# Patient Record
Sex: MALE | Race: BLACK OR AFRICAN AMERICAN | NOT HISPANIC OR LATINO | URBAN - METROPOLITAN AREA
[De-identification: names, ages, dates, MRNs, and addresses within clinical notes are randomized per-mention and may not be internally consistent; named-entity substitution may affect disease eponyms.]

---

## 2019-08-09 ENCOUNTER — INPATIENT (INPATIENT)
Facility: HOSPITAL | Age: 39
LOS: 0 days | Discharge: HOME | End: 2019-08-10
Attending: HOSPITALIST | Admitting: HOSPITALIST
Payer: COMMERCIAL

## 2019-08-09 VITALS
OXYGEN SATURATION: 96 % | RESPIRATION RATE: 18 BRPM | DIASTOLIC BLOOD PRESSURE: 75 MMHG | HEART RATE: 113 BPM | SYSTOLIC BLOOD PRESSURE: 126 MMHG | TEMPERATURE: 102 F

## 2019-08-09 DIAGNOSIS — Z29.9 ENCOUNTER FOR PROPHYLACTIC MEASURES, UNSPECIFIED: ICD-10-CM

## 2019-08-09 DIAGNOSIS — G03.9 MENINGITIS, UNSPECIFIED: ICD-10-CM

## 2019-08-09 DIAGNOSIS — A41.9 SEPSIS, UNSPECIFIED ORGANISM: ICD-10-CM

## 2019-08-09 LAB
ALBUMIN SERPL ELPH-MCNC: 4.6 G/DL — SIGNIFICANT CHANGE UP (ref 3.3–5)
ALP SERPL-CCNC: 60 U/L — SIGNIFICANT CHANGE UP (ref 40–120)
ALT FLD-CCNC: 28 U/L — SIGNIFICANT CHANGE UP (ref 4–41)
ANION GAP SERPL CALC-SCNC: 12 MMO/L — SIGNIFICANT CHANGE UP (ref 7–14)
APPEARANCE UR: CLEAR — SIGNIFICANT CHANGE UP
APTT BLD: 33.9 SEC — SIGNIFICANT CHANGE UP (ref 27.5–36.3)
AST SERPL-CCNC: 29 U/L — SIGNIFICANT CHANGE UP (ref 4–40)
BASE EXCESS BLDV CALC-SCNC: 2.8 MMOL/L — SIGNIFICANT CHANGE UP
BASOPHILS # BLD AUTO: 0.03 K/UL — SIGNIFICANT CHANGE UP (ref 0–0.2)
BASOPHILS NFR BLD AUTO: 0.3 % — SIGNIFICANT CHANGE UP (ref 0–2)
BILIRUB SERPL-MCNC: 0.3 MG/DL — SIGNIFICANT CHANGE UP (ref 0.2–1.2)
BILIRUB UR-MCNC: NEGATIVE — SIGNIFICANT CHANGE UP
BLOOD GAS VENOUS - CREATININE: 1.09 MG/DL — SIGNIFICANT CHANGE UP (ref 0.5–1.3)
BLOOD UR QL VISUAL: NEGATIVE — SIGNIFICANT CHANGE UP
BUN SERPL-MCNC: 10 MG/DL — SIGNIFICANT CHANGE UP (ref 7–23)
CALCIUM SERPL-MCNC: 9.5 MG/DL — SIGNIFICANT CHANGE UP (ref 8.4–10.5)
CHLORIDE BLDV-SCNC: 104 MMOL/L — SIGNIFICANT CHANGE UP (ref 96–108)
CHLORIDE SERPL-SCNC: 98 MMOL/L — SIGNIFICANT CHANGE UP (ref 98–107)
CK SERPL-CCNC: 262 U/L — HIGH (ref 30–200)
CO2 SERPL-SCNC: 25 MMOL/L — SIGNIFICANT CHANGE UP (ref 22–31)
COLOR SPEC: SIGNIFICANT CHANGE UP
CREAT SERPL-MCNC: 1.13 MG/DL — SIGNIFICANT CHANGE UP (ref 0.5–1.3)
EOSINOPHIL # BLD AUTO: 0.01 K/UL — SIGNIFICANT CHANGE UP (ref 0–0.5)
EOSINOPHIL NFR BLD AUTO: 0.1 % — SIGNIFICANT CHANGE UP (ref 0–6)
GAS PNL BLDV: 133 MMOL/L — LOW (ref 136–146)
GLUCOSE BLDV-MCNC: 114 MG/DL — HIGH (ref 70–99)
GLUCOSE SERPL-MCNC: 103 MG/DL — HIGH (ref 70–99)
GLUCOSE UR-MCNC: NEGATIVE — SIGNIFICANT CHANGE UP
HCO3 BLDV-SCNC: 25 MMOL/L — SIGNIFICANT CHANGE UP (ref 20–27)
HCT VFR BLD CALC: 43.6 % — SIGNIFICANT CHANGE UP (ref 39–50)
HCT VFR BLDV CALC: 40.9 % — SIGNIFICANT CHANGE UP (ref 39–51)
HGB BLD-MCNC: 13.1 G/DL — SIGNIFICANT CHANGE UP (ref 13–17)
HGB BLDV-MCNC: 13.3 G/DL — SIGNIFICANT CHANGE UP (ref 13–17)
IMM GRANULOCYTES NFR BLD AUTO: 0.4 % — SIGNIFICANT CHANGE UP (ref 0–1.5)
INR BLD: 1.14 — SIGNIFICANT CHANGE UP (ref 0.88–1.17)
KETONES UR-MCNC: NEGATIVE — SIGNIFICANT CHANGE UP
LACTATE BLDV-MCNC: 1.5 MMOL/L — SIGNIFICANT CHANGE UP (ref 0.5–2)
LEUKOCYTE ESTERASE UR-ACNC: NEGATIVE — SIGNIFICANT CHANGE UP
LYMPHOCYTES # BLD AUTO: 1.07 K/UL — SIGNIFICANT CHANGE UP (ref 1–3.3)
LYMPHOCYTES # BLD AUTO: 11.7 % — LOW (ref 13–44)
MCHC RBC-ENTMCNC: 24.4 PG — LOW (ref 27–34)
MCHC RBC-ENTMCNC: 30 % — LOW (ref 32–36)
MCV RBC AUTO: 81.2 FL — SIGNIFICANT CHANGE UP (ref 80–100)
MONOCYTES # BLD AUTO: 1.08 K/UL — HIGH (ref 0–0.9)
MONOCYTES NFR BLD AUTO: 11.8 % — SIGNIFICANT CHANGE UP (ref 2–14)
NEUTROPHILS # BLD AUTO: 6.93 K/UL — SIGNIFICANT CHANGE UP (ref 1.8–7.4)
NEUTROPHILS NFR BLD AUTO: 75.7 % — SIGNIFICANT CHANGE UP (ref 43–77)
NITRITE UR-MCNC: NEGATIVE — SIGNIFICANT CHANGE UP
NRBC # FLD: 0 K/UL — SIGNIFICANT CHANGE UP (ref 0–0)
PCO2 BLDV: 50 MMHG — SIGNIFICANT CHANGE UP (ref 41–51)
PH BLDV: 7.36 PH — SIGNIFICANT CHANGE UP (ref 7.32–7.43)
PH UR: 6.5 — SIGNIFICANT CHANGE UP (ref 5–8)
PLATELET # BLD AUTO: 171 K/UL — SIGNIFICANT CHANGE UP (ref 150–400)
PMV BLD: 11 FL — SIGNIFICANT CHANGE UP (ref 7–13)
PO2 BLDV: 29 MMHG — LOW (ref 35–40)
POTASSIUM BLDV-SCNC: 3.6 MMOL/L — SIGNIFICANT CHANGE UP (ref 3.4–4.5)
POTASSIUM SERPL-MCNC: 4 MMOL/L — SIGNIFICANT CHANGE UP (ref 3.5–5.3)
POTASSIUM SERPL-SCNC: 4 MMOL/L — SIGNIFICANT CHANGE UP (ref 3.5–5.3)
PROT SERPL-MCNC: 8.4 G/DL — HIGH (ref 6–8.3)
PROT UR-MCNC: NEGATIVE — SIGNIFICANT CHANGE UP
PROTHROM AB SERPL-ACNC: 13 SEC — SIGNIFICANT CHANGE UP (ref 9.8–13.1)
RBC # BLD: 5.37 M/UL — SIGNIFICANT CHANGE UP (ref 4.2–5.8)
RBC # FLD: 15.8 % — HIGH (ref 10.3–14.5)
SAO2 % BLDV: 45.7 % — LOW (ref 60–85)
SODIUM SERPL-SCNC: 135 MMOL/L — SIGNIFICANT CHANGE UP (ref 135–145)
SP GR SPEC: 1.01 — SIGNIFICANT CHANGE UP (ref 1–1.04)
UROBILINOGEN FLD QL: NORMAL — SIGNIFICANT CHANGE UP
WBC # BLD: 9.16 K/UL — SIGNIFICANT CHANGE UP (ref 3.8–10.5)
WBC # FLD AUTO: 9.16 K/UL — SIGNIFICANT CHANGE UP (ref 3.8–10.5)

## 2019-08-09 PROCEDURE — 99223 1ST HOSP IP/OBS HIGH 75: CPT | Mod: GC

## 2019-08-09 PROCEDURE — 71046 X-RAY EXAM CHEST 2 VIEWS: CPT | Mod: 26

## 2019-08-09 PROCEDURE — 70491 CT SOFT TISSUE NECK W/DYE: CPT | Mod: 26

## 2019-08-09 PROCEDURE — 70450 CT HEAD/BRAIN W/O DYE: CPT | Mod: 26

## 2019-08-09 RX ORDER — DEXAMETHASONE 0.5 MG/5ML
10 ELIXIR ORAL ONCE
Refills: 0 | Status: DISCONTINUED | OUTPATIENT
Start: 2019-08-09 | End: 2019-08-09

## 2019-08-09 RX ORDER — VANCOMYCIN HCL 1 G
1000 VIAL (EA) INTRAVENOUS EVERY 12 HOURS
Refills: 0 | Status: DISCONTINUED | OUTPATIENT
Start: 2019-08-10 | End: 2019-08-10

## 2019-08-09 RX ORDER — SODIUM CHLORIDE 9 MG/ML
1000 INJECTION INTRAMUSCULAR; INTRAVENOUS; SUBCUTANEOUS ONCE
Refills: 0 | Status: COMPLETED | OUTPATIENT
Start: 2019-08-09 | End: 2019-08-09

## 2019-08-09 RX ORDER — CEFTRIAXONE 500 MG/1
2000 INJECTION, POWDER, FOR SOLUTION INTRAMUSCULAR; INTRAVENOUS EVERY 12 HOURS
Refills: 0 | Status: DISCONTINUED | OUTPATIENT
Start: 2019-08-09 | End: 2019-08-10

## 2019-08-09 RX ORDER — CEFTRIAXONE 500 MG/1
2000 INJECTION, POWDER, FOR SOLUTION INTRAMUSCULAR; INTRAVENOUS ONCE
Refills: 0 | Status: COMPLETED | OUTPATIENT
Start: 2019-08-09 | End: 2019-08-09

## 2019-08-09 RX ORDER — KETOROLAC TROMETHAMINE 30 MG/ML
15 SYRINGE (ML) INJECTION ONCE
Refills: 0 | Status: DISCONTINUED | OUTPATIENT
Start: 2019-08-09 | End: 2019-08-09

## 2019-08-09 RX ORDER — DEXAMETHASONE 0.5 MG/5ML
10 ELIXIR ORAL ONCE
Refills: 0 | Status: COMPLETED | OUTPATIENT
Start: 2019-08-09 | End: 2019-08-09

## 2019-08-09 RX ORDER — ACETAMINOPHEN 500 MG
975 TABLET ORAL ONCE
Refills: 0 | Status: COMPLETED | OUTPATIENT
Start: 2019-08-09 | End: 2019-08-09

## 2019-08-09 RX ORDER — VANCOMYCIN HCL 1 G
1000 VIAL (EA) INTRAVENOUS ONCE
Refills: 0 | Status: COMPLETED | OUTPATIENT
Start: 2019-08-09 | End: 2019-08-09

## 2019-08-09 RX ORDER — KETOROLAC TROMETHAMINE 30 MG/ML
30 SYRINGE (ML) INJECTION ONCE
Refills: 0 | Status: DISCONTINUED | OUTPATIENT
Start: 2019-08-09 | End: 2019-08-09

## 2019-08-09 RX ADMIN — Medication 250 MILLIGRAM(S): at 14:51

## 2019-08-09 RX ADMIN — Medication 102 MILLIGRAM(S): at 20:16

## 2019-08-09 RX ADMIN — Medication 15 MILLIGRAM(S): at 13:29

## 2019-08-09 RX ADMIN — SODIUM CHLORIDE 1000 MILLILITER(S): 9 INJECTION INTRAMUSCULAR; INTRAVENOUS; SUBCUTANEOUS at 19:52

## 2019-08-09 RX ADMIN — Medication 15 MILLIGRAM(S): at 19:53

## 2019-08-09 RX ADMIN — CEFTRIAXONE 100 MILLIGRAM(S): 500 INJECTION, POWDER, FOR SOLUTION INTRAMUSCULAR; INTRAVENOUS at 22:43

## 2019-08-09 RX ADMIN — SODIUM CHLORIDE 1000 MILLILITER(S): 9 INJECTION INTRAMUSCULAR; INTRAVENOUS; SUBCUTANEOUS at 13:29

## 2019-08-09 RX ADMIN — CEFTRIAXONE 100 MILLIGRAM(S): 500 INJECTION, POWDER, FOR SOLUTION INTRAMUSCULAR; INTRAVENOUS at 14:28

## 2019-08-09 RX ADMIN — Medication 975 MILLIGRAM(S): at 19:53

## 2019-08-09 RX ADMIN — Medication 30 MILLIGRAM(S): at 20:16

## 2019-08-09 NOTE — ED PROVIDER NOTE - OBJECTIVE STATEMENT
39y male with no significant PMH presenting with headache and fever.  Started yesterday with sore throat and fevers, he also had difficulty swallowing. Today developed a diffuse headache with sensitivity to light and neck stiffness, bilateral hand tingling. He also has some substernal chest pain, no SOB. No abdominal pain, nausea, vomiting, urinary symptoms.

## 2019-08-09 NOTE — ED PROVIDER NOTE - ATTENDING CONTRIBUTION TO CARE
I performed a face-to-face evaluation of the patient and performed a history and physical examination. I agree with the history and physical examination.    Elsa: Sore throat and F x 2 days, today HA and neck stiffness, photophobia. Concern for meningitis vs. throat abscess. Abx, CT, LP, labs, IVF. I performed a face-to-face evaluation of the patient and performed a history and physical examination. I agree with the history and physical examination.    Elsa: Sore throat and F x 2 days, today HA and neck stiffness, photophobia. Looks good, neck supple, not confused. Consider meningitis vs. throat abscess vs. viral or bacterial pharyngitis. Abx, CT, labs, IVF. If not improve, consider LP.

## 2019-08-09 NOTE — H&P ADULT - NSHPLABSRESULTS_GEN_ALL_CORE
The Labs were reviewed by me   The Radiology was reviewed by me    EKG tracing reviewed by me        135  |  98  |  10  ----------------------------<  103<H>  4.0   |  25  |  1.13    Ca    9.5      09 Aug 2019 13:02    TPro  8.4<H>  /  Alb  4.6  /  TBili  0.3  /  DBili  x   /  AST  29  /  ALT  28  /  AlkPhos  60            PT/INR - ( 09 Aug 2019 13:02 )   PT: 13.0 SEC;   INR: 1.14          PTT - ( 09 Aug 2019 13:02 )  PTT:33.9 SEC              Urinalysis Basic - ( 09 Aug 2019 16:00 )    Color: LIGHT YELLOW / Appearance: CLEAR / S.013 / pH: 6.5  Gluc: NEGATIVE / Ketone: NEGATIVE  / Bili: NEGATIVE / Urobili: NORMAL   Blood: NEGATIVE / Protein: NEGATIVE / Nitrite: NEGATIVE   Leuk Esterase: NEGATIVE / RBC: x / WBC x   Sq Epi: x / Non Sq Epi: x / Bacteria: x                              13.1   9.16  )-----------( 171      ( 09 Aug 2019 13:02 )             43.6     CAPILLARY BLOOD GLUCOSE The Labs were reviewed by me   The Radiology was reviewed by me    EKG tracing reviewed by me        135  |  98  |  10  ----------------------------<  103<H>  4.0   |  25  |  1.13    Ca    9.5      09 Aug 2019 13:02    TPro  8.4<H>  /  Alb  4.6  /  TBili  0.3  /  DBili  x   /  AST  29  /  ALT  28  /  AlkPhos  60            PT/INR - ( 09 Aug 2019 13:02 )   PT: 13.0 SEC;   INR: 1.14          PTT - ( 09 Aug 2019 13:02 )  PTT:33.9 SEC              Urinalysis Basic - ( 09 Aug 2019 16:00 )    Color: LIGHT YELLOW / Appearance: CLEAR / S.013 / pH: 6.5  Gluc: NEGATIVE / Ketone: NEGATIVE  / Bili: NEGATIVE / Urobili: NORMAL   Blood: NEGATIVE / Protein: NEGATIVE / Nitrite: NEGATIVE   Leuk Esterase: NEGATIVE / RBC: x / WBC x   Sq Epi: x / Non Sq Epi: x / Bacteria: x                              13.1   9.16  )-----------( 171      ( 09 Aug 2019 13:02 )             43.6     CAPILLARY BLOOD GLUCOSE      EKG personally reviewed, Sinus tachycardia, no acute findings.

## 2019-08-09 NOTE — ED ADULT NURSE NOTE - NSIMPLEMENTINTERV_GEN_ALL_ED
Implemented All Universal Safety Interventions:  [place] to call system. Call bell, personal items and telephone within reach. Instruct patient to call for assistance. Room bathroom lighting operational. Non-slip footwear when patient is off stretcher. Physically safe environment: no spills, clutter or unnecessary equipment. Stretcher in lowest position, wheels locked, appropriate side rails in place.

## 2019-08-09 NOTE — ED ADULT NURSE NOTE - OBJECTIVE STATEMENT
39 year old male with no PMH presents with fever, headache, sore throat and cough since yesterday   PIV placed labs drawn

## 2019-08-09 NOTE — ED PROVIDER NOTE - PHYSICAL EXAMINATION
Gen: AAOx3, non-toxic  Head: NCAT  HEENT: EOMI, PERRL, oral mucosa moist with oropharyngeal erythema, normal conjunctiva. No submental tenderness, no elevation of the sublingual space, no neck massess  Lung: CTAB, no respiratory distress, no wheezes/rhonchi/rales B/L, speaking in full sentences  CV: RRR, no murmurs, rubs or gallops  Abd: soft, NTND, no guarding, no CVA tenderness  MSK: no visible deformities, neck supple, full ROM, no TTP  Neuro: No focal sensory or motor deficits  Skin: Warm, well perfused, no rash  Psych: normal affect.   ~Miguel Opal PGY2

## 2019-08-09 NOTE — ED ADULT TRIAGE NOTE - CHIEF COMPLAINT QUOTE
Patient reports HA, dizziness, difficulty swallowing, cough and throat pain x 2 days. Patient is febrile oral in triage 102.1.  Patient's airway patent, breathing spontaneous and unlabored, able to follow commands.

## 2019-08-09 NOTE — H&P ADULT - ASSESSMENT
Patient is a 39 y.o M w/ no significant PMH who p/w fever and headaches concerning for acute meningitis Patient is a 39 y.o M w/ no significant PMH who was admitted for meeting sepsis criteria likely 2/2 strep pharyngitis vs. viral pharyngitis vs acute meningitis

## 2019-08-09 NOTE — H&P ADULT - PROBLEM SELECTOR PLAN 1
-P/w fevers Tmax 103 and headaches concerning for acute meningitis  -CT head negative for hemorrhage   -s/p vanc x1 and ceftriaxone x1   -s/p LP -At presentation, meets SIRS criteria w/ Tmax 103, tachycardia 101-120. Source likely meningitis given headache   -Currently normotensive, lactate 1.5  -s/p NS 1 L bolus x2  -UA negative, CXR clear  -s/p ceftriaxone 2g x1 and vancomycin x1  -f/u BCX, UCX  -tylenol PRN for fever -At presentation, meets SIRS criteria w/ Tmax 103, tachycardia 101-120. Source likely 2/2 pharyngitis vs. meningitis   -Currently normotensive, lactate 1.5, no leukocytosis   -s/p NS 1 L bolus x2  -UA negative, CXR clear  -s/p ceftriaxone 2g x1 and vancomycin x1  -f/u BCX, UCX  -tylenol PRN for fever -At presentation, meets SIRS criteria w/ Tmax 103, tachycardia 101-120. Source likely 2/2 pharyngitis vs. meningitis   -Appears non-toxic; currently normotensive, lactate 1.5, no leukocytosis   -s/p NS 1 L bolus x2  -UA negative, CXR clear  -s/p ceftriaxone 2g x1 and vancomycin x1  -f/u BCX, UCX  -tylenol PRN for fever -At presentation, meets SIRS criteria w/ Tmax 103, tachycardia 101-120. Source likely 2/2 pharyngitis vs. meningitis   -Appears non-toxic; currently normotensive, lactate 1.5, no leukocytosis   -s/p NS 1 L bolus x2  -UA negative, CXR clear  -s/p ceftriaxone 2g x1 and vancomycin x1  -Per Centor criterion; score of 2 (absent cough, fever, unable to visual tonsils). f/u rapid antigen strep test  -Low suspicion for acute bacterial meningitis as appears non-toxic, no neck stiffness, and headache is resolving s/p toradol, but will r/o. Plan for IR guided LP  -Will c/w empiric abx therapy to cover for meningitis for now  -f/u BCX, UCX  -tylenol PRN for fever -At presentation, meets SIRS criteria w/ Tmax 103, tachycardia 101-120. Source likely 2/2 pharyngitis vs. meningitis   -Appears non-toxic; currently normotensive, lactate 1.5, no leukocytosis   -s/p NS 1 L bolus x2  -UA negative, CXR clear  -s/p ceftriaxone 2g x1 and vancomycin x1  -Per Centor criterion; score of 3 (absent cough, fever, tonsillar exudates noted). f/u strep culture  -Low suspicion for acute bacterial meningitis as appears non-toxic, no neck stiffness, and headache is resolving s/p toradol, but will r/o. Plan for IR guided LP  -Will c/w empiric abx therapy to cover for meningitis for now  -f/u BCX, UCX  -tylenol PRN for fever

## 2019-08-09 NOTE — ED PROCEDURE NOTE - ATTENDING CONTRIBUTION TO CARE
Karina Carias M.D: pt with fever headache neck pain, requiring LP to ro meningitis. performed by dr ram, with myself at bedside. pt unable to stay still during procedure and thus unable to obtain csf.

## 2019-08-09 NOTE — H&P ADULT - PROBLEM SELECTOR PLAN 3
IMPROVE: 4; will hold off on anticoagulation for anticipated LP procedure   Diet: regular diet -P/w fevers Tmax 103 and headaches; need to r/o community acquired acute meningitis; CT head negative for hemorrhage   -s/p vanc x1 and ceftriaxone x1   -s/p decadron IV 10 x1 to prevent neurological sequelae in setting of possible acute meningitis, particularly S. pneumo. No neurological deficits noted  -Unable to obtain LP in ED; consult IR in AM   -Given age and no known comorbidities, most common organisms for meningitis include S. pneumo, N. meningitidis, H. influenza  -will c/w empiric abx ceftriaxone 2 g IV daily, vancomycin 1 g daily, and acyclovir IV q8h for now  -neuro checks q4h

## 2019-08-09 NOTE — H&P ADULT - NSHPREVIEWOFSYSTEMS_GEN_ALL_CORE
CONSTITUTIONAL:  No weight loss, fever, chills, weakness or fatigue.  HEENT:  Eyes:  No visual loss, blurred vision, double vision or yellow sclerae. Ears, Nose, Throat:  No hearing loss, sneezing, congestion, runny nose or sore throat.  SKIN:  No rash or itching.  CARDIOVASCULAR:  No chest pain, chest pressure or chest discomfort. No palpitations or edema.  RESPIRATORY:  No shortness of breath, cough or sputum.  GASTROINTESTINAL:  No anorexia, nausea, vomiting or diarrhea. No abdominal pain or blood.  GENITOURINARY:  Denies hematuria, dysuria.   NEUROLOGICAL:  No headache, dizziness, syncope, paralysis, ataxia, numbness or tingling in the extremities. No change in bowel or bladder control.  MUSCULOSKELETAL:  No muscle, back pain, joint pain or stiffness.  HEMATOLOGIC:  No anemia, bleeding or bruising.  LYMPHATICS:  No enlarged nodes. No history of splenectomy.  PSYCHIATRIC:  No history of depression or anxiety.  ENDOCRINOLOGIC:  No reports of sweating, cold or heat intolerance. No polyuria or polydipsia.  ALLERGIES:  No history of asthma, hives, eczema or rhinitis. CONSTITUTIONAL: +fever, No weight loss, weakness or fatigue.  HEENT:  Eyes:  No visual loss, blurred vision, double vision or yellow sclerae. Ears, Nose, Throat:  No hearing loss, sneezing, congestion, runny nose or sore throat.  SKIN:  No rash or itching.  CARDIOVASCULAR:  No chest pain, chest pressure or chest discomfort. No palpitations or edema.  RESPIRATORY:  No shortness of breath, cough or sputum.  GASTROINTESTINAL:  No anorexia, nausea, vomiting or diarrhea. No abdominal pain or blood.  GENITOURINARY:  Denies hematuria, dysuria.   NEUROLOGICAL:  No headache, dizziness, syncope, paralysis, ataxia, numbness or tingling in the extremities. No change in bowel or bladder control.  MUSCULOSKELETAL:  No muscle, back pain, joint pain or stiffness.  HEMATOLOGIC:  No anemia, bleeding or bruising.  LYMPHATICS:  No enlarged nodes. No history of splenectomy.  PSYCHIATRIC:  No history of depression or anxiety.  ENDOCRINOLOGIC:  No reports of sweating, cold or heat intolerance. No polyuria or polydipsia.  ALLERGIES:  No history of asthma, hives, eczema or rhinitis. CONSTITUTIONAL: +fever, No weight loss, weakness or fatigue.  HEENT:  Eyes:  No visual loss, blurred vision, double vision or yellow sclerae. Ears, Nose, Throat:  +sore throat, No hearing loss, sneezing, congestion, runny nose   SKIN:  No rash or itching.  CARDIOVASCULAR:  No chest pain, chest pressure or chest discomfort. No palpitations or edema.  RESPIRATORY:  No shortness of breath, cough or sputum.  GASTROINTESTINAL:  No anorexia, nausea, vomiting or diarrhea. No abdominal pain or blood.  GENITOURINARY:  Denies hematuria, dysuria.   NEUROLOGICAL:  No headache, dizziness, syncope, paralysis, ataxia, numbness or tingling in the extremities. No change in bowel or bladder control.  MUSCULOSKELETAL:  No muscle, back pain, joint pain or stiffness.  HEMATOLOGIC:  No anemia, bleeding or bruising.  LYMPHATICS:  No enlarged nodes. No history of splenectomy.  PSYCHIATRIC:  No history of depression or anxiety.  ENDOCRINOLOGIC:  No reports of sweating, cold or heat intolerance. No polyuria or polydipsia.  ALLERGIES:  No history of asthma, hives, eczema or rhinitis. CONSTITUTIONAL: +fever, No weight loss, weakness or fatigue.  HEENT:  Eyes:  No visual loss, blurred vision, double vision or yellow sclerae. Ears, Nose, Throat:  +sore throat, No hearing loss, sneezing, congestion, runny nose   SKIN:  No rash or itching.  CARDIOVASCULAR:  No chest pain, chest pressure or chest discomfort. No palpitations or edema.  RESPIRATORY:  No shortness of breath, cough or sputum.  GASTROINTESTINAL:  No anorexia, nausea, vomiting or diarrhea. No abdominal pain or blood.  GENITOURINARY:  Denies hematuria, dysuria.   NEUROLOGICAL:  No headache, dizziness, syncope, paralysis, ataxia, numbness or tingling in the extremities. No change in bowel or bladder control.  MUSCULOSKELETAL:  No muscle, back pain, joint pain or stiffness.

## 2019-08-09 NOTE — ED PROVIDER NOTE - CLINICAL SUMMARY MEDICAL DECISION MAKING FREE TEXT BOX
Elsa: Sore throat and F x 2 days, today HA and neck stiffness, photophobia. Concern for meningitis vs. throat abscess. Abx, CT, LP, labs, IVF. Elsa: Sore throat and F x 2 days, today HA and neck stiffness, photophobia. Looks good, neck supple, not confused. Consider meningitis vs. throat abscess vs. viral or bacterial pharyngitis. Abx, CT, labs, IVF. If not improve, consider LP.

## 2019-08-09 NOTE — H&P ADULT - NSHPPHYSICALEXAM_GEN_ALL_CORE
PHYSICAL EXAM:  GENERAL: NAD, well-developed  HEAD:  Atraumatic, Normocephalic  EYES: EOMI, PERRLA, conjunctiva and sclera clear  NECK: Supple, No JVD  CHEST/LUNG: Clear to auscultation bilaterally; No wheeze  HEART: Regular rate and rhythm; No murmurs, rubs, or gallops  ABDOMEN: Soft, Nontender, Nondistended; Bowel sounds present  EXTREMITIES:  2+ Peripheral Pulses, No clubbing, cyanosis, or edema  PSYCH: AAOx3  NEUROLOGY: non-focal  SKIN: No rashes or lesions PHYSICAL EXAM:  GENERAL: NAD, well-developed  HEAD:  Atraumatic, Normocephalic, unable to visualize posterior pharynx, no ulcers/lesions on tongue  EYES: EOMI, PERRLA, pterygium noted in medial aspect of left eye, no scleral icterus  NECK: Supple, no neck stiffness, no lymphadenopathy, No JVD  CHEST/LUNG: Clear to auscultation bilaterally; No wheeze  HEART: S1 and S2, tachycardic, normal rhythm; No murmurs, rubs, or gallops  ABDOMEN: Soft, Nontender, Nondistended; Bowel sounds present  EXTREMITIES:  2+ Peripheral Pulses, No clubbing, cyanosis, or edema  PSYCH: AAOx3  NEUROLOGY: CN II-XII intact  SKIN: No rashes or lesions T(C): 36.9 (08-10-19 @ 04:30), Max: 39.4 (08-09-19 @ 19:40)  HR: 116 (08-10-19 @ 04:30) (101 - 120)  BP: 144/93 (08-10-19 @ 04:30) (122/74 - 150/74)  RR: 16 (08-10-19 @ 04:30) (16 - 22)  SpO2: 98% (08-10-19 @ 04:30) (96% - 100%)    Constitutional: NAD, well-developed, well-nourished  Ears, Nose, Mouth, and Throat: +tonsillar exudates, normal external ears and nose, normal hearing  Eyes: normal conjunctiva, EOMI, PERRL, + pterygium noted in medial aspect of left eye  Neck: supple, no JVD  Respiratory: Clear to auscultation bilaterally. No wheezes, rales or rhonchi. Normal respiratory effort  Cardiovascular: RRR, no M/R/G, no edema, 2+ Peripheral Pulses  Gastrointestinal: soft, nontender, nondistended, +BS, no hernia  Skin: warm, dry, no rash  Neurologic: sensation grossly intact, CN grossly intact, non-focal exam  Musculoskeletal: no clubbing, no cyanosis, no joint swelling  Psychiatric: AOX3, appropriate mood, affect

## 2019-08-09 NOTE — H&P ADULT - NSHPSOCIALHISTORY_GEN_ALL_CORE
Smoking:   Alcohol:  drugs: Smoking: denies  Alcohol: socially   drugs: denies  sexually active w/ wife only   works as

## 2019-08-09 NOTE — ED PROVIDER NOTE - NS ED ROS FT
Gen: + fever  Eyes: +sensitivity to light. No eye irritation or discharge  ENT: +sore throat  Resp: No cough or SOB  Cardiovascular: + chest pain  GI: No abdominal pain, nausea/vomiting, diarrhea, constipation  :  No change in urine output or frequency; no dysuria  MS: diffuse muscle aches  Skin: No rashes  Neuro: + headache; bilateral hand tingling. No numbness or weakness  Remainder negative, except as per the HPI

## 2019-08-09 NOTE — ED PROVIDER NOTE - PROGRESS NOTE DETAILS
Karina Carias M.D: pt signed out to me pending ct heda and neck and possible LP by Dr Hunter. PGY2/MD Rosi. Pt feels better, light borthering him, no leulocytosis, likely aseptic meningitis, will LP him. Tachycardia, temp over 103, well appearing, temporal improvement from tylenhol/ketolac, needs admission for intractable fever, tachy, possible meningitis. started abx as bacterial meningitis. Karina Carias M.D: LP attempted without success. pt unable to tolerate, continued to sit up and move around while attempting procedure. in too much pain to successfully get tap. expressed findings to MAR who is aware.

## 2019-08-09 NOTE — H&P ADULT - PROBLEM SELECTOR PLAN 2
-P/w fevers Tmax 103 and headaches concerning for community acquired acute meningitis; CT head negative for hemorrhage   -s/p vanc x1 and ceftriaxone x1   -s/p decadron IV 10 x1 to prevent neurological sequelae in setting of possible acute meningitis, particularly S. pneumo. No neurological deficits noted  -s/p LP; f/u CSF analysis  -Given age and no known comorbidities, most common organisms for meningitis include S. pneumo and N. meningitis  -will c/w empiric abx ceftriaxone 2 g IV daily and vancomycin 1 g daily for now  -neuro checks q4h -P/w fevers Tmax 103 and headaches; need to r/o community acquired acute meningitis; CT head negative for hemorrhage   -s/p vanc x1 and ceftriaxone x1   -s/p decadron IV 10 x1 to prevent neurological sequelae in setting of possible acute meningitis, particularly S. pneumo. No neurological deficits noted  -Unable to obtain LP in ED; consult IR in AM   -Given age and no known comorbidities, most common organisms for meningitis include S. pneumo and N. meningitis  -will c/w empiric abx ceftriaxone 2 g IV daily and vancomycin 1 g daily for now  -neuro checks q4h -P/w fevers Tmax 103 and headaches; need to r/o community acquired acute meningitis; CT head negative for hemorrhage   -s/p vanc x1 and ceftriaxone x1   -s/p decadron IV 10 x1 to prevent neurological sequelae in setting of possible acute meningitis, particularly S. pneumo. No neurological deficits noted  -Unable to obtain LP in ED; consult IR in AM   -Given age and no known comorbidities, most common organisms for meningitis include S. pneumo, N. meningitidis, H. influenza  -will c/w empiric abx ceftriaxone 2 g IV daily and vancomycin 1 g daily for now  -neuro checks q4h -P/w fevers Tmax 103 and headaches; need to r/o community acquired acute meningitis; CT head negative for hemorrhage   -s/p vanc x1 and ceftriaxone x1   -s/p decadron IV 10 x1 to prevent neurological sequelae in setting of possible acute meningitis, particularly S. pneumo. No neurological deficits noted  -Unable to obtain LP in ED; consult IR in AM   -Given age and no known comorbidities, most common organisms for meningitis include S. pneumo, N. meningitidis, H. influenza  -will c/w empiric abx ceftriaxone 2 g IV daily, vancomycin 1 g daily, and acyclovir IV q8h for now  -neuro checks q4h -Per Centor criterion; score of 3 (absent cough, fever, tonsillar exudates noted). f/u strep culture, c/w Abx as above

## 2019-08-09 NOTE — H&P ADULT - HISTORY OF PRESENT ILLNESS
Patient is a 39 y.o M w/ no PMH who presents w/ headaches and fevers that started yesterday. **THIS NOTE IS INCOMPLETE** Patient is a 39 y.o M w/ no PMH who presents w/ headaches, fevers, and sore throat. 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 Since then, he started to have a sore throat that progressively got worse since Tuesday. His sore throat worsens w/ swallowing and he denies any cough, sputum production, or recent sick contacts. He also admits to a generalized headache and malaise w/o associated  neck pain/stiffness, rashes, confusion, photophobia or phonophobia. Yesterday, patient reports feeling fever/chills which prompted him to come into the E.D. Patient denies symptoms of CP, SOB, abdominal pain, diarrhea, or dysuria.    In the ED, vitals T 103, -120 /74 - 150/74 RR 16-20 SPO2 % on RA. UA neg, CT head revealed no hemorrhage, CT neck mildly enlarged left cervical II lymph node. s/p vanc x1, ceftriaxone 2g x1, decadron 10 x1, toradol, NS 1 L bolus x2. An LP was attempted in the E.D. but was unable to obtain sample. Patient is a 39 y.o M w/ no PMH who presents w/ headaches, fevers, and sore throat. 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 Since then, he started to have a sore throat that progressively got worse since Tuesday. His sore throat worsens w/ swallowing and he denies any cough, sputum production, rhinorrhea, or recent sick contacts. He also admits to a generalized headache and malaise w/o associated  neck pain/stiffness, rashes, confusion, photophobia or phonophobia. Yesterday, patient reports feeling fever/chills which prompted him to come into the E.D. Patient denies symptoms of CP, SOB, abdominal pain, diarrhea, or dysuria.    In the ED, vitals T 103, -120 /74 - 150/74 RR 16-20 SPO2 % on RA. UA neg, CT head revealed no hemorrhage, CT neck mildly enlarged left cervical II lymph node. s/p vanc x1, ceftriaxone 2g x1, decadron 10 x1, toradol, NS 1 L bolus x2. An LP was attempted in the E.D. but was unable to obtain sample.

## 2019-08-10 ENCOUNTER — TRANSCRIPTION ENCOUNTER (OUTPATIENT)
Age: 39
End: 2019-08-10

## 2019-08-10 VITALS
RESPIRATION RATE: 17 BRPM | SYSTOLIC BLOOD PRESSURE: 147 MMHG | TEMPERATURE: 99 F | OXYGEN SATURATION: 100 % | DIASTOLIC BLOOD PRESSURE: 76 MMHG | HEART RATE: 112 BPM

## 2019-08-10 DIAGNOSIS — G03.9 MENINGITIS, UNSPECIFIED: ICD-10-CM

## 2019-08-10 DIAGNOSIS — J02.9 ACUTE PHARYNGITIS, UNSPECIFIED: ICD-10-CM

## 2019-08-10 DIAGNOSIS — J02.0 STREPTOCOCCAL PHARYNGITIS: ICD-10-CM

## 2019-08-10 DIAGNOSIS — A41.9 SEPSIS, UNSPECIFIED ORGANISM: ICD-10-CM

## 2019-08-10 LAB

## 2019-08-10 PROCEDURE — 99239 HOSP IP/OBS DSCHRG MGMT >30: CPT | Mod: GC

## 2019-08-10 RX ORDER — ACETAMINOPHEN 500 MG
650 TABLET ORAL EVERY 6 HOURS
Refills: 0 | Status: DISCONTINUED | OUTPATIENT
Start: 2019-08-10 | End: 2019-08-10

## 2019-08-10 RX ORDER — AMOXICILLIN 250 MG/5ML
1 SUSPENSION, RECONSTITUTED, ORAL (ML) ORAL
Qty: 16 | Refills: 0
Start: 2019-08-10 | End: 2019-08-17

## 2019-08-10 RX ORDER — ACYCLOVIR SODIUM 500 MG
VIAL (EA) INTRAVENOUS
Refills: 0 | Status: DISCONTINUED | OUTPATIENT
Start: 2019-08-10 | End: 2019-08-10

## 2019-08-10 RX ORDER — SODIUM CHLORIDE 9 MG/ML
500 INJECTION, SOLUTION INTRAVENOUS ONCE
Refills: 0 | Status: COMPLETED | OUTPATIENT
Start: 2019-08-10 | End: 2019-08-10

## 2019-08-10 RX ORDER — CEFTRIAXONE 500 MG/1
1000 INJECTION, POWDER, FOR SOLUTION INTRAMUSCULAR; INTRAVENOUS EVERY 24 HOURS
Refills: 0 | Status: DISCONTINUED | OUTPATIENT
Start: 2019-08-10 | End: 2019-08-10

## 2019-08-10 RX ORDER — BENZOCAINE AND MENTHOL 5; 1 G/100ML; G/100ML
1 LIQUID ORAL EVERY 4 HOURS
Refills: 0 | Status: DISCONTINUED | OUTPATIENT
Start: 2019-08-10 | End: 2019-08-10

## 2019-08-10 RX ADMIN — BENZOCAINE AND MENTHOL 1 LOZENGE: 5; 1 LIQUID ORAL at 10:46

## 2019-08-10 RX ADMIN — BENZOCAINE AND MENTHOL 1 LOZENGE: 5; 1 LIQUID ORAL at 17:07

## 2019-08-10 RX ADMIN — SODIUM CHLORIDE 1000 MILLILITER(S): 9 INJECTION, SOLUTION INTRAVENOUS at 12:19

## 2019-08-10 RX ADMIN — Medication 650 MILLIGRAM(S): at 07:03

## 2019-08-10 RX ADMIN — Medication 250 MILLIGRAM(S): at 05:56

## 2019-08-10 RX ADMIN — CEFTRIAXONE 100 MILLIGRAM(S): 500 INJECTION, POWDER, FOR SOLUTION INTRAMUSCULAR; INTRAVENOUS at 10:46

## 2019-08-10 RX ADMIN — Medication 650 MILLIGRAM(S): at 06:03

## 2019-08-10 NOTE — PROGRESS NOTE ADULT - PROBLEM SELECTOR PLAN 1
- Initially presented febrile and tachycardic, concerning for sepsis.  - Lactate 1.5, current SOFA/QSOFA 0, SIRS 1 for tachycardia  - Unlikely sepsis, f/u blood cultures  - Received 2L crystalloid, developed no hypotension, currently afebrile - Initially presented febrile and tachycardic, concerning for sepsis; however source likely pharyngitis.   - Lactate 1.5, current SOFA/QSOFA 0, SIRS 1 for tachycardia  - Unlikely sepsis, f/u blood cultures  - Received 2L crystalloid, developed no hypotension, currently afebrile - Initially presented febrile and tachycardic, concerning for sepsis; however, currently only tachycardic;  source likely pharyngitis.   - Lactate 1.5, current SOFA/QSOFA 0, SIRS 1 for tachycardia  - Unlikely sepsis, f/u blood cultures  - Received 2L crystalloid, developed no hypotension, currently afebrile

## 2019-08-10 NOTE — PROGRESS NOTE ADULT - SUBJECTIVE AND OBJECTIVE BOX
[facility]ican[facility] Quintanilla [facility]  Progress note    Subjective: 39 year old male with no past medical history complaining of several days of headache, fever, and sore throat who experienced a rapid worsening of symptoms on 8/10 and presented to the ED where he presented febrile and was concerning for sepsis possibly 2/2 meningitis and received IV Ceftriaxone, Vancomycin, Acyclovir post failed LP    Interval: patient admitted overnight, since arriving states he feels significantly better post abx, IVF, and acetominophen. Headache is resolved, denies neck stiffness or photophobia. States only currently complain is a sore throat, which he is concerned about a strep infection due to having a young child at home. Denies CP, SOB, weakness, chills, diarrhea, lethargy, confusion.     Vital Signs Last 24 Hrs  T(C): 36.9 (10 Aug 2019 04:30), Max: 39.4 (09 Aug 2019 19:40)  T(F): 98.4 (10 Aug 2019 04:30), Max: 103 (09 Aug 2019 19:40)  HR: 116 (10 Aug 2019 04:30) (101 - 120)  BP: 144/93 (10 Aug 2019 04:30) (122/74 - 150/74)  BP(mean): --  RR: 16 (10 Aug 2019 04:30) (16 - 22)  SpO2: 98% (10 Aug 2019 04:30) (96% - 100%)    PHYSICAL EXAM:  GENERAL: NAD, well-groomed, well-developed  HEENT - EOMI, pupils PERRL, pterygium noted on L eye, throat erethematous w/ tonsilar exudate  NECK: No JVD, trachea midline, no palpable lymph nodes, no tenderness to palpation, no massess appreciated, full range of motion, supple, no nuchal rigidity, negative brudnizky  CHEST/LUNG: CTA bilat, no chest wall tenderness, equal and symmetric chest rise  HEART: Regular rhythm; No murmurs, rubs, or gallops  ABDOMEN: Soft, Nontender, Nondistended  EXTREMITIES:  2+ Peripheral Pulses, No cyanosis or edema  NEURO:  A+O3, no gross FND, CN II-XII grossly intact  SKIN: No rashes or lesions    Consultant(s) Notes Reviewed:  [x ] YES  [ ] NO  Care Discussed with Consultants/Other Providers [ x] YES  [ ] NO    LABS:      LABS:  08-09 @ 13:02 Creatine 262 u/L<H> [30 - 200]  cret                        13.1   9.16  )-----------( 171      ( 09 Aug 2019 13:02 )             43.6     08-09    135  |  98  |  10  ----------------------------<  103<H>  4.0   |  25  |  1.13    Ca    9.5      09 Aug 2019 13:02    TPro  8.4<H>  /  Alb  4.6  /  TBili  0.3  /  DBili  x   /  AST  29  /  ALT  28  /  AlkPhos  60  08-09    PT/INR - ( 09 Aug 2019 13:02 )   PT: 13.0 SEC;   INR: 1.14          PTT - ( 09 Aug 2019 13:02 )  PTT:33.9 SEC                RADIOLOGY & ADDITIONAL TESTS:    Imaging Personally Reviewed:  [X] YES  [ ] NO      MedsMEDICATIONS  (STANDING):  acyclovir IVPB      cefTRIAXone   IVPB 2000 milliGRAM(s) IV Intermittent every 12 hours  vancomycin  IVPB 1000 milliGRAM(s) IV Intermittent every 12 hours    MEDICATIONS  (PRN):  acetaminophen   Tablet .. 650 milliGRAM(s) Oral every 6 hours PRN Mild Pain (1 - 3), Moderate Pain (4 - 6)  benzocaine 15 mG/menthol 3.6 mG Lozenge 1 Lozenge Oral every 4 hours PRN Sore Throat      HEALTH ISSUES - PROBLEM Dx:  Strep pharyngitis: Strep pharyngitis  Sepsis: Sepsis  Prophylactic measure: Prophylactic measure [facility] Quintanilla [facility]  Progress note    Subjective: 39 year old male with no past medical history complaining of several days of headache, fever, and sore throat who experienced a rapid worsening of symptoms on 8/10 and came to the ED where he presented febrile and was concerning for sepsis possibly 2/2 meningitis and received IV Ceftriaxone, Vancomycin, Acyclovir post failed LP    Interval: Patient admitted overnight, since arriving states he feels significantly better post abx, IVF, and acetominophen. Headache is resolved, denies neck stiffness or photophobia. States only currently complain is a sore throat, which he is concerned about a strep infection due to having a young child at home. Denies CP, SOB, weakness, chills, diarrhea, lethargy, confusion.     Vital Signs Last 24 Hrs  T(C): 36.9 (10 Aug 2019 04:30), Max: 39.4 (09 Aug 2019 19:40)  T(F): 98.4 (10 Aug 2019 04:30), Max: 103 (09 Aug 2019 19:40)  HR: 116 (10 Aug 2019 04:30) (101 - 120)  BP: 144/93 (10 Aug 2019 04:30) (122/74 - 150/74)  BP(mean): --  RR: 16 (10 Aug 2019 04:30) (16 - 22)  SpO2: 98% (10 Aug 2019 04:30) (96% - 100%)    PHYSICAL EXAM:  GENERAL: NAD, well-groomed, well-developed  HEENT - EOMI, pupils PERRL, pterygium noted on L eye, throat erethematous w/ tonsilar exudate  NECK: No JVD, trachea midline, no palpable lymph nodes in anterior cervical or occiput, no tenderness to palpation, no massess appreciated, full range of motion, supple, no nuchal rigidity, negative brudnizky  CHEST/LUNG: CTA bilat, no chest wall tenderness, equal and symmetric chest rise  HEART: Regular rhythm; No murmurs, rubs, or gallops  ABDOMEN: Soft, Nontender, Nondistended  EXTREMITIES:  2+ Peripheral Pulses, No cyanosis or edema  NEURO:  A+O3, no gross FND, CN II-XII grossly intact  SKIN: No rashes or lesions    Consultant(s) Notes Reviewed:  [x ] YES  [ ] NO  Care Discussed with Consultants/Other Providers [ x] YES  [ ] NO    LABS:      LABS:  08-09 @ 13:02 Creatine 262 u/L<H> [30 - 200]  cret                        13.1   9.16  )-----------( 171      ( 09 Aug 2019 13:02 )             43.6     08-09    135  |  98  |  10  ----------------------------<  103<H>  4.0   |  25  |  1.13    Ca    9.5      09 Aug 2019 13:02    TPro  8.4<H>  /  Alb  4.6  /  TBili  0.3  /  DBili  x   /  AST  29  /  ALT  28  /  AlkPhos  60  08-09    PT/INR - ( 09 Aug 2019 13:02 )   PT: 13.0 SEC;   INR: 1.14          PTT - ( 09 Aug 2019 13:02 )  PTT:33.9 SEC                RADIOLOGY & ADDITIONAL TESTS:    Imaging Personally Reviewed:  [X] YES  [ ] NO      MedsMEDICATIONS  (STANDING):  cefTRIAXone   IVPB 1000 milliGRAM(s) IV Intermittent every 12 hours    MEDICATIONS  (PRN):  acetaminophen   Tablet .. 650 milliGRAM(s) Oral every 6 hours PRN Mild Pain (1 - 3), Moderate Pain (4 - 6)  benzocaine 15 mG/menthol 3.6 mG Lozenge 1 Lozenge Oral every 4 hours PRN Sore Throat      HEALTH ISSUES - PROBLEM Dx:  Strep pharyngitis: Strep pharyngitis  Sepsis: Sepsis  Prophylactic measure: Prophylactic measure [facility] Quintanilla [facility]  Progress note    Subjective: 39 year old male with no past medical history complaining of several days of headache, fever, and sore throat who experienced a rapid worsening of symptoms on 8/10 and came to the ED where he presented febrile and was concerning for sepsis possibly 2/2 meningitis and received IV Ceftriaxone, Vancomycin, Acyclovir post failed LP    Interval: Patient admitted overnight, since arriving states he feels significantly better post abx, IVF, and acetominophen. Headache is resolved, denies neck stiffness or photophobia. States he only had neck stiffness because he slept on his couch in a "weird" position. Reports only currently complaint is a sore throat, which he is concerned about a strep infection due to having a young child at home. Denies CP, SOB, weakness, chills, diarrhea, lethargy, confusion.     Vital Signs Last 24 Hrs  T(C): 36.9 (10 Aug 2019 04:30), Max: 39.4 (09 Aug 2019 19:40)  T(F): 98.4 (10 Aug 2019 04:30), Max: 103 (09 Aug 2019 19:40)  HR: 116 (10 Aug 2019 04:30) (101 - 120)  BP: 144/93 (10 Aug 2019 04:30) (122/74 - 150/74)  BP(mean): --  RR: 16 (10 Aug 2019 04:30) (16 - 22)  SpO2: 98% (10 Aug 2019 04:30) (96% - 100%)    PHYSICAL EXAM:  GENERAL: NAD, well-groomed, well-developed  HEENT - EOMI, pupils PERRL, pterygium noted on L eye, throat erythematous w/ tonsilar exudate  NECK: No JVD, trachea midline, no palpable lymph nodes in anterior cervical or occiput, no tenderness to palpation, no masses appreciated, full range of motion, supple, no nuchal rigidity, negative brudnizky  CHEST/LUNG: CTA bilat, no chest wall tenderness, equal and symmetric chest rise  HEART: Regular rhythm; No murmurs, rubs, or gallops  ABDOMEN: Soft, Nontender, Nondistended  EXTREMITIES:  2+ Peripheral Pulses, No cyanosis or edema  NEURO:  A+O3, no gross FND, CN II-XII grossly intact  SKIN: No rashes or lesions    Consultant(s) Notes Reviewed:  [x ] YES  [ ] NO  Care Discussed with Consultants/Other Providers [ x] YES  [ ] NO    LABS:      LABS:  08-09 @ 13:02 Creatine 262 u/L<H> [30 - 200]  cret                        13.1   9.16  )-----------( 171      ( 09 Aug 2019 13:02 )             43.6     08-09    135  |  98  |  10  ----------------------------<  103<H>  4.0   |  25  |  1.13    Ca    9.5      09 Aug 2019 13:02    TPro  8.4<H>  /  Alb  4.6  /  TBili  0.3  /  DBili  x   /  AST  29  /  ALT  28  /  AlkPhos  60  08-09    PT/INR - ( 09 Aug 2019 13:02 )   PT: 13.0 SEC;   INR: 1.14          PTT - ( 09 Aug 2019 13:02 )  PTT:33.9 SEC                RADIOLOGY & ADDITIONAL TESTS:    Imaging Personally Reviewed:  [X] YES  [ ] NO      MedsMEDICATIONS  (STANDING):  cefTRIAXone   IVPB 1000 milliGRAM(s) IV Intermittent every 12 hours    MEDICATIONS  (PRN):  acetaminophen   Tablet .. 650 milliGRAM(s) Oral every 6 hours PRN Mild Pain (1 - 3), Moderate Pain (4 - 6)  benzocaine 15 mG/menthol 3.6 mG Lozenge 1 Lozenge Oral every 4 hours PRN Sore Throat      HEALTH ISSUES - PROBLEM Dx:  Strep pharyngitis: Strep pharyngitis  Sepsis: Sepsis  Prophylactic measure: Prophylactic measure

## 2019-08-10 NOTE — PROGRESS NOTE ADULT - PROBLEM SELECTOR PLAN 3
- Currently only complaining of mild sore throat, pt concerned due to  and risk of transmitting infection  - Able to tolerate PO  - CENTOR 3, f/u strep test.   - RVP negative  - Acetominophen 650PRN - Currently only complaining of mild sore throat, pt concerned due to  and risk of transmitting infection  - Able to tolerate PO  - CENTOR 3, f/u strep test.   - RVP negative  - Acetominophen 650PRN  - discharge with empiric PO amoxicillin if blood cx clear. - Currently only complaining of mild sore throat, pt concerned due to  and risk of transmitting infection  - Able to tolerate PO  - CENTOR 3, f/u strep test.   - RVP negative  - Acetominophen 650PRN  - rapid strep test unavailable as inpatient  - discharge with empiric PO amoxicillin if blood cx clear.

## 2019-08-10 NOTE — DISCHARGE NOTE PROVIDER - HOSPITAL COURSE
This document is incomplete, pending clinical course.         The patient is a 39 year old male who presented for an acute worsening of fever, headache, and sore throat. In the ED there was concern for possible sepsis due to tachycardia to 120s and Tmax 103. ED concerned for meningitis vs pharyngitis and attempted LP with no success, prompting empiric treatment with ceftriaxone, vancomycin, acyclovir, and dexamethosone. Overnight the patient was given acetominophen for fever and 2L of NS. In the morning he felt much better and was afebrile. Patient denied headache, photophobia, neck stiffness, confusion, rashes, chest pain, SOB. WBC count 9.16. RVP was negative, UA clear. CT neck showed no abscesses, CT head showed no acute pathology. CXR showed no acute pathology. A strep culture was performed due to a Centor score of 3. The patients presentation in the morning was less consistent with meningitis and abx were deescalated to 1g Ceftriaxone. Blood cultures returned with no growth. Due to concern for transmission of possible strep pharyngitis, the patient was discharged on amoxicillin PO for 8 additional days. The patient is a 39 year old male who presented for an acute worsening of fever, headache, and sore throat. In the ED there was concern for possible sepsis due to tachycardia to 120s and Tmax 103. ED concerned for meningitis vs pharyngitis and attempted LP with no success, prompting empiric treatment with ceftriaxone, vancomycin, acyclovir, and dexamethosone. Overnight the patient was given acetominophen for fever and 2L of NS. In the morning he felt much better and was afebrile. Patient denied headache, photophobia, neck stiffness, confusion, rashes, chest pain, SOB. WBC count 9.16. RVP was negative, UA clear. CT neck showed no abscesses, CT head showed no acute pathology. CXR showed no acute pathology. A strep culture was performed due to a Centor score of 3. The patients presentation in the morning was less consistent with meningitis and abx were deescalated to 1g Ceftriaxone. Blood cultures returned with no growth. Due to concern for transmission of possible strep pharyngitis, the patient was discharged on amoxicillin PO for 8 additional days. The patient is a 39-year-old male who presented for an acute worsening of fever, headache, and sore throat.         In the ED there was concern for possible sepsis due to tachycardia to 120s and Tmax 103. ED concerned for meningitis vs pharyngitis and attempted LP with no success, prompting empiric treatment with ceftriaxone, vancomycin, acyclovir, and dexamethosone. Overnight the patient was given acetominophen for fever and 2L of NS. In the morning he felt much better and was afebrile. Patient denied headache, photophobia, neck stiffness, confusion, rashes, chest pain, SOB. WBC count 9.16. RVP was negative, UA clear. CT neck showed no abscesses, CT head showed no acute pathology. CXR showed no acute pathology. A strep culture was performed due to a Centor score of 3. The patients presentation in the morning was less consistent with meningitis and abx were deescalated to 1g Ceftriaxone. Blood cultures returned with no growth. Due to concern for transmission of possible strep pharyngitis, the patient was discharged on amoxicillin PO for 8 additional days.

## 2019-08-10 NOTE — DISCHARGE NOTE NURSING/CASE MANAGEMENT/SOCIAL WORK - NSDCDPATPORTLINK_GEN_ALL_CORE
You can access the [facility] Portal, offered by [facility], by registering with the following website: [email or web address]

## 2019-08-10 NOTE — PROGRESS NOTE ADULT - PROBLEM SELECTOR PLAN 2
- Received empiric Acyclovir, Vancomycin, Ceftriaxone, Dexamethasone. Will c/w ceftriaxone 1g/daily for now. Unlikely meningitis  - No LP needed at this time. Patient no longer c/o headache, photophobia, neck stiffness.   - Continue to monitor, neuro exams - Received empiric Acyclovir, Vancomycin, Ceftriaxone, Dexamethasone. Will c/w ceftriaxone 1g/daily for now. Unlikely meningitis  - No LP needed at this time. Patient no longer c/o headache, photophobia, neck stiffness. No nuchal rigidity on exam - Received empiric Acyclovir, Vancomycin, Ceftriaxone, Dexamethasone. Will c/w ceftriaxone 1g/daily for now. Patient states neck stiffness was due to sleeping on a sofa in a strange position. Unlikely meningitis  - No LP needed at this time. Patient no longer c/o headache, photophobia, neck stiffness. No nuchal rigidity on exam

## 2019-08-10 NOTE — ED ADULT NURSE REASSESSMENT NOTE - NS ED NURSE REASSESS COMMENT FT1
Received report from day shift RN Raisa. pt A&OX3, laying in bed comfortably. Respirations are equal and nonlabored, no respiratory distress noted. Sinus tachycardic on monitor. pt afebrile orally. vitals as noted. pt denies any pain, sob, dizziness at this time. Pt medicated as per orders. Pt stable, awaiting further plan
break RN: pt asleep. awakens to touch. breathing even and unlabored. c/o sore throat. denies other symptoms. awaits bed in no acute distress.

## 2019-08-10 NOTE — PROGRESS NOTE ADULT - ASSESSMENT
39 year old male with no past medical history who presented for rapid worsening of sore throat, headache, and fever and was empirically treated for community aquired meningitis with Ceftriaxone, Acyclovir, Vancomycin, Dexamethasone w/ 2L NS and Acetominophen. Currently feeling well besides sore throat, unlikely meningitis based on current presentation, follow up strep testing for bacterial vs viral pharyngitis.

## 2019-08-10 NOTE — PROGRESS NOTE ADULT - ATTENDING COMMENTS
Patient with tonsilar exudates, fever, lack of cough, concerning for GAS pharyngitis. As he has already been started on abx which would treat this and the rapid strep assay is not available inpatient, will c/w empiric treatment.     Tachycardia is appropriate inflammatory response in this otherwise healthy patient. No other signs of systemic inflammation or organ dysfunction.    I spent 35 minutes counseling this patient and coordinating their discharge.    I examined this patient and discussed their management with house staff on 8/10/2019.

## 2019-08-10 NOTE — DISCHARGE NOTE PROVIDER - NSDCCPCAREPLAN_GEN_ALL_CORE_FT
PRINCIPAL DISCHARGE DIAGNOSIS  Diagnosis: Acute pharyngitis  Assessment and Plan of Treatment: You have an infection of your throat. We sent out to test for a bacterial infection with Step, but based on your exam we will send you home with antibiotics and may contact you to discontinue them if the results are negative. Please make sure to finish your antibiotics as prescribed, even if you start to feel better. If you have fevers you can take Tylenol or other products containing acetominophen. For the sore throat lozenges can help reduce the pain. PRINCIPAL DISCHARGE DIAGNOSIS  Diagnosis: Acute pharyngitis  Assessment and Plan of Treatment: You have an infection of your throat. We sent out to test for a bacterial infection with Step, but based on your exam we will send you home with antibiotics and may contact you to discontinue them if the results are negative. Please make sure to finish your antibiotics as prescribed, even if you start to feel better. If you have fevers you can take Tylenol or other products containing acetominophen. For the sore throat lozenges can help reduce the pain. Follow up with your primary care doctor within 1 week; seek medical attention for difficulty moving neck, persisent high fevers despite antibiotics or other concerns.

## 2019-08-10 NOTE — PROVIDER CONTACT NOTE (OTHER) - ASSESSMENT
patient AxOx4, denies chest pain, SOB, n/v/dizziness, afebrile at this time, patient complaining of pain in throat.

## 2019-08-11 ENCOUNTER — EMERGENCY (EMERGENCY)
Facility: HOSPITAL | Age: 39
LOS: 1 days | Discharge: HOME | End: 2019-08-11
Attending: EMERGENCY MEDICINE | Admitting: EMERGENCY MEDICINE
Payer: COMMERCIAL

## 2019-08-11 VITALS
RESPIRATION RATE: 15 BRPM | OXYGEN SATURATION: 100 % | HEART RATE: 100 BPM | DIASTOLIC BLOOD PRESSURE: 96 MMHG | SYSTOLIC BLOOD PRESSURE: 150 MMHG | TEMPERATURE: 98 F

## 2019-08-11 LAB
BACTERIA UR CULT: SIGNIFICANT CHANGE UP
SPECIMEN SOURCE: SIGNIFICANT CHANGE UP
SPECIMEN SOURCE: SIGNIFICANT CHANGE UP

## 2019-08-11 PROCEDURE — 99283 EMERGENCY DEPT VISIT LOW MDM: CPT

## 2019-08-11 NOTE — ED PROVIDER NOTE - CLINICAL SUMMARY MEDICAL DECISION MAKING FREE TEXT BOX
40 yo M previously healthy M presents 6 hours after taking 2 extra strength tylenol and 2.5 tbsp of nyquil for pts sore throat, after he took the medicine he "couldn't control his thoughts," "couldn't remember where he was today." On exam, 40 yo M previously healthy M presents 6 hours after taking 2 extra strength tylenol and 2.5 tbsp of nyquil for pts sore throat, after he took the medicine he "couldn't control his thoughts," "couldn't remember where he was today." On exam,  VS otherwise wnl, non-focal neuro exam, no remarkable exam findings, no signs of infection. will dc with pmd f/u

## 2019-08-11 NOTE — ED PROVIDER NOTE - NSFOLLOWUPINSTRUCTIONS_ED_ALL_ED_FT
Patient returned call, would like a call back to schedule.   1. You were seen for your thoughts. A copy of any of your resulted labs, imaging, and findings have been provided to you.   2. Continue to take your home medications as prescribed.   3. Follow up with your primary care doctor within 48 hours to assess the symptoms you were seen for in the emergency department and to review all results from your visit. If you don't have a doctor, call [phone] to make an appointment.  4. Return immediately to the emergency department for new, persistent, or worsening symptoms or signs. Return immediately to the emergency department if you have chest pain, shortness of breath, loss of consciousness, or fever, or thoughts of hurting yourself or others.

## 2019-08-11 NOTE — ED PROVIDER NOTE - OBJECTIVE STATEMENT
40 yo M previously healthy M presents 6 hours after taking 2 extra strength tylenol and 2.5 tbsp of nyquil for pts sore throat, after he took the medicine he "couldn't control his thoughts," "couldn't remember where he was today." denies depression, anxiety, si, hi, or hallucinations. denies h/o sx. denies tobacco/alcohol/drug use. d/c'd 2 d/a for acute pharyngitis, has abx waiting for him at pharmacy.

## 2019-08-11 NOTE — ED ADULT TRIAGE NOTE - CHIEF COMPLAINT QUOTE
alert states he took 2 extra strength tylenol and took nyquil at the same time at 2200     I feel like "my thoughts are everywhere"   no med hx

## 2019-08-11 NOTE — ED ADULT NURSE NOTE - OBJECTIVE STATEMENT
pt a&Ox3, breathing even & unlabored, stateshe took 2 extra strength tylenols (does not know miligrams)& nightquil ( states he did not know it was nightquil) and now thinks he might have overdosed because his "thoughts are everywhere." pt was recently dc from [facility] with strep & states he did not get much sleep. pt denies n/v/d, dizziness, headache, blurry vision, cp, sob. Awaiting MD gardiner

## 2019-08-11 NOTE — ED PROVIDER NOTE - ATTENDING CONTRIBUTION TO CARE
Hemostasis: Aluminum Chloride Silver Nitrate Text: The wound bed was treated with silver nitrate after the biopsy was performed. Curettage Text: The wound bed was treated with curettage after the biopsy was performed. Biopsy Type: H and E Electrodesiccation Text: The wound bed was treated with electrodesiccation after the biopsy was performed. Post-Care Instructions: I reviewed with the patient in detail post-care instructions. Patient is to keep the biopsy site dry overnight, and then apply bacitracin twice daily until healed. Billing Type: Third-Party Bill Anesthesia Type: 1% lidocaine with epinephrine Lab: 3 Depth Of Biopsy: dermis Dressing: bandage Bill For Surgical Tray: no Wound Care: Vaseline Biopsy Method: double edge Personna blade Was A Bandage Applied: Yes Notification Instructions: Patient will be notified of biopsy results. However, patient instructed to call the office if not contacted within 2 weeks. Consent: Written consent was obtained and risks were reviewed including but not limited to scarring, infection, bleeding, scabbing, incomplete removal, nerve damage and allergy to anesthesia. X Size Of Lesion In Cm: 0 Electrodesiccation And Curettage Text: The wound bed was treated with electrodesiccation and curettage after the biopsy was performed. Detail Level: Detailed Anesthesia Volume In Cc (Will Not Render If 0): 0.5 Cryotherapy Text: The wound bed was treated with cryotherapy after the biopsy was performed. Lab Facility: 1 Type Of Destruction Used: Curettage 38 y/o M with recent admission for r/o meningitis, discharged yesterday with antibiotics for pharyngitis here with racing thoughts.  Pt reports he returned home tonight.  Before bed, he took tylenol and nyquil.  As he was falling asleep, pt states he felt his mind racing.  Pt describes feeling confused and "out of sorts."  He denies any other medications.  No drug or etoh use.  Pt reports he was discharged from the hospital earlier in the day.  While hospitalized he states he had not been sleeping.  No HA, fever, vision changes, other complaints.  Denies AH/VH.  Well appearing, sitting comfortably in chair, awake and alert, nontoxic.  VSS.  NCAT EOMI PERRL.  Lungs cta bl.  Cards nl S1/S2, RRR, no MRG.  Abd soft ntnd.  No pedal edema or calf tenderness.  No focal neuro deficits.  Pt is back to baseline, no focal neuro deficits, no psych complaints.  No obvious toxidrome on exam.  Likely 2/2 recent hospitalization with insomnia/poor sleep in combination with nyquil use.  Reassurance provided, will dc home with PMD follow-up.

## 2019-08-11 NOTE — ED PROVIDER NOTE - PHYSICAL EXAMINATION
Follow up with Neurology, Orthopedics, Neurosurgery, ENT as discussed  Discussed healthy diet  Call with concerns  Influenza vaccine today   Continue speech, OT at school hoarse voice  NEURO: pupils 3 mm, PERRL, EOMI (CN III, IV, VI), facial sensation intact to light touch in all 3 divisions bilat (CN V), face is symmetric with normal eye closure, eye opening, and smile (CN VII), hearing is normal to rubbing fingers (CN VII), palate elevates symmetrically, phonation is normal (CN IX, X),  shoulder shrug intact bilat (CN XI), tongue is midline with nl movements and no atrophy (CN XII), finger to nose test nl bilat, negative pronator drift bilat, speech is clear; 5/5 motor strength BUE and BLE: deltoids, biceps, triceps, wrist flexors/extensors, hand , hip flexors, knee flexors/extensors, plantar/dorsiflexors, hallux flexors/extensors; sensation intact to light touch BUE and BLE: C5-T1 and L3-S1; gait wnl

## 2019-08-11 NOTE — ED ADULT NURSE NOTE - NSIMPLEMENTINTERV_GEN_ALL_ED
Implemented All Universal Safety Interventions:  [place] to call system. Call bell, personal items and telephone within reach. Instruct patient to call for assistance. Room bathroom lighting operational. Non-slip footwear when patient is off stretcher. Physically safe environment: no spills, clutter or unnecessary equipment. Stretcher in lowest position, wheels locked, appropriate side rails in place.

## 2019-08-12 LAB — S PYO SPEC QL CULT: SIGNIFICANT CHANGE UP

## 2019-08-14 LAB
BACTERIA BLD CULT: SIGNIFICANT CHANGE UP
BACTERIA BLD CULT: SIGNIFICANT CHANGE UP

## 2019-08-23 ENCOUNTER — TRANSCRIPTION ENCOUNTER (OUTPATIENT)
Age: 39
End: 2019-08-23

## 2019-11-26 ENCOUNTER — EMERGENCY (EMERGENCY)
Facility: HOSPITAL | Age: 39
LOS: 1 days | Discharge: HOME | End: 2019-11-26
Attending: EMERGENCY MEDICINE | Admitting: EMERGENCY MEDICINE

## 2019-11-26 VITALS
SYSTOLIC BLOOD PRESSURE: 139 MMHG | TEMPERATURE: 98 F | OXYGEN SATURATION: 100 % | HEART RATE: 76 BPM | RESPIRATION RATE: 16 BRPM | WEIGHT: 250 LBS | DIASTOLIC BLOOD PRESSURE: 93 MMHG

## 2019-11-26 NOTE — ED ADULT TRIAGE NOTE - CHIEF COMPLAINT QUOTE
Ambulatory from home complaining of CP that woke him from sleep. Patient denies air travel or long car rides, PMH/PSH, SOB. States the pain has subsided now and is at 3/10 but he is anxious and wants to "make sure that he is fine." VSS, NAD.

## 2021-03-23 ENCOUNTER — EMERGENCY (EMERGENCY)
Facility: HOSPITAL | Age: 41
LOS: 1 days | Discharge: HOME | End: 2021-03-23
Attending: EMERGENCY MEDICINE | Admitting: EMERGENCY MEDICINE
Payer: MEDICAID

## 2021-03-23 VITALS
HEART RATE: 86 BPM | OXYGEN SATURATION: 100 % | HEIGHT: 66 IN | SYSTOLIC BLOOD PRESSURE: 145 MMHG | TEMPERATURE: 98 F | DIASTOLIC BLOOD PRESSURE: 82 MMHG | RESPIRATION RATE: 17 BRPM

## 2021-03-23 PROCEDURE — 93010 ELECTROCARDIOGRAM REPORT: CPT

## 2021-03-23 PROCEDURE — 99285 EMERGENCY DEPT VISIT HI MDM: CPT | Mod: 25

## 2021-03-23 NOTE — ED ADULT TRIAGE NOTE - CHIEF COMPLAINT QUOTE
Pt c/o midsternal chest pressure radiating to the back beginning tonight at home while running up the stairs. states that he took his blood pressure at home 155/100. denies hx of HTN. pt denies pain at this time. denies any sob, palpitations, dizziness, lightheaded, nausea. appears to be in no distress

## 2021-03-24 VITALS
RESPIRATION RATE: 16 BRPM | SYSTOLIC BLOOD PRESSURE: 136 MMHG | HEART RATE: 79 BPM | DIASTOLIC BLOOD PRESSURE: 80 MMHG | OXYGEN SATURATION: 99 %

## 2021-03-24 LAB
ALBUMIN SERPL ELPH-MCNC: 4.6 G/DL — SIGNIFICANT CHANGE UP (ref 3.3–5)
ALP SERPL-CCNC: 61 U/L — SIGNIFICANT CHANGE UP (ref 40–120)
ALT FLD-CCNC: 28 U/L — SIGNIFICANT CHANGE UP (ref 4–41)
ANION GAP SERPL CALC-SCNC: 10 MMOL/L — SIGNIFICANT CHANGE UP (ref 7–14)
AST SERPL-CCNC: 28 U/L — SIGNIFICANT CHANGE UP (ref 4–40)
BASOPHILS # BLD AUTO: 0.03 K/UL — SIGNIFICANT CHANGE UP (ref 0–0.2)
BASOPHILS NFR BLD AUTO: 0.4 % — SIGNIFICANT CHANGE UP (ref 0–2)
BILIRUB SERPL-MCNC: 0.2 MG/DL — SIGNIFICANT CHANGE UP (ref 0.2–1.2)
BUN SERPL-MCNC: 16 MG/DL — SIGNIFICANT CHANGE UP (ref 7–23)
CALCIUM SERPL-MCNC: 10.3 MG/DL — SIGNIFICANT CHANGE UP (ref 8.4–10.5)
CHLORIDE SERPL-SCNC: 100 MMOL/L — SIGNIFICANT CHANGE UP (ref 98–107)
CO2 SERPL-SCNC: 27 MMOL/L — SIGNIFICANT CHANGE UP (ref 22–31)
CREAT SERPL-MCNC: 1.03 MG/DL — SIGNIFICANT CHANGE UP (ref 0.5–1.3)
EOSINOPHIL # BLD AUTO: 0.13 K/UL — SIGNIFICANT CHANGE UP (ref 0–0.5)
EOSINOPHIL NFR BLD AUTO: 1.9 % — SIGNIFICANT CHANGE UP (ref 0–6)
GLUCOSE SERPL-MCNC: 88 MG/DL — SIGNIFICANT CHANGE UP (ref 70–99)
HCT VFR BLD CALC: 44.1 % — SIGNIFICANT CHANGE UP (ref 39–50)
HGB BLD-MCNC: 13.2 G/DL — SIGNIFICANT CHANGE UP (ref 13–17)
IANC: 3.31 K/UL — SIGNIFICANT CHANGE UP (ref 1.5–8.5)
IMM GRANULOCYTES NFR BLD AUTO: 0.4 % — SIGNIFICANT CHANGE UP (ref 0–1.5)
LYMPHOCYTES # BLD AUTO: 2.76 K/UL — SIGNIFICANT CHANGE UP (ref 1–3.3)
LYMPHOCYTES # BLD AUTO: 40.3 % — SIGNIFICANT CHANGE UP (ref 13–44)
MCHC RBC-ENTMCNC: 24.5 PG — LOW (ref 27–34)
MCHC RBC-ENTMCNC: 29.9 GM/DL — LOW (ref 32–36)
MCV RBC AUTO: 81.8 FL — SIGNIFICANT CHANGE UP (ref 80–100)
MONOCYTES # BLD AUTO: 0.59 K/UL — SIGNIFICANT CHANGE UP (ref 0–0.9)
MONOCYTES NFR BLD AUTO: 8.6 % — SIGNIFICANT CHANGE UP (ref 2–14)
NEUTROPHILS # BLD AUTO: 3.31 K/UL — SIGNIFICANT CHANGE UP (ref 1.8–7.4)
NEUTROPHILS NFR BLD AUTO: 48.4 % — SIGNIFICANT CHANGE UP (ref 43–77)
NRBC # BLD: 0 /100 WBCS — SIGNIFICANT CHANGE UP
NRBC # FLD: 0 K/UL — SIGNIFICANT CHANGE UP
NT-PROBNP SERPL-SCNC: 10 PG/ML — SIGNIFICANT CHANGE UP
PLATELET # BLD AUTO: 178 K/UL — SIGNIFICANT CHANGE UP (ref 150–400)
POTASSIUM SERPL-MCNC: 4.3 MMOL/L — SIGNIFICANT CHANGE UP (ref 3.5–5.3)
POTASSIUM SERPL-SCNC: 4.3 MMOL/L — SIGNIFICANT CHANGE UP (ref 3.5–5.3)
PROT SERPL-MCNC: 8.3 G/DL — SIGNIFICANT CHANGE UP (ref 6–8.3)
RBC # BLD: 5.39 M/UL — SIGNIFICANT CHANGE UP (ref 4.2–5.8)
RBC # FLD: 15.3 % — HIGH (ref 10.3–14.5)
SODIUM SERPL-SCNC: 137 MMOL/L — SIGNIFICANT CHANGE UP (ref 135–145)
TROPONIN T, HIGH SENSITIVITY RESULT: <6 NG/L — SIGNIFICANT CHANGE UP
WBC # BLD: 6.85 K/UL — SIGNIFICANT CHANGE UP (ref 3.8–10.5)
WBC # FLD AUTO: 6.85 K/UL — SIGNIFICANT CHANGE UP (ref 3.8–10.5)

## 2021-03-24 PROCEDURE — 71046 X-RAY EXAM CHEST 2 VIEWS: CPT | Mod: 26

## 2021-03-24 RX ORDER — ASPIRIN/CALCIUM CARB/MAGNESIUM 324 MG
162 TABLET ORAL ONCE
Refills: 0 | Status: COMPLETED | OUTPATIENT
Start: 2021-03-24 | End: 2021-03-24

## 2021-03-24 RX ADMIN — Medication 162 MILLIGRAM(S): at 03:12

## 2021-03-24 NOTE — ED PROVIDER NOTE - PATIENT PORTAL LINK FT
You can access the FollowMyHealth Patient Portal offered by [facility] by registering at the following website: [email or web address]. By joining [employer]’s FollowMyHealth portal, you will also be able to view your health information using other applications (apps) compatible with our system.

## 2021-03-24 NOTE — ED PROVIDER NOTE - OBJECTIVE STATEMENT
40M non smoker presents with worsening angina this week assoc with high blood pressure, sys In the 150's. Pain has been intermittent for weeks but ignored it until 9pm last night when midsternal cp started when he was running up and down the stairs, lasted 20 minutes, improved after resting, non radiating, non pleuritic, sharp. Patient denies SOB, f/c, cough, abd pain, N/V/D/C, weakness, HA, dizziness, urinary symptoms, extremity pain or swelling or other complaints. 40M non smoker presents with worsening cp this week assoc with high blood pressure, sys In the 150's. Pain has been intermittent for weeks but ignored it until 9pm last night when midsternal cp started when he was running up and down the stairs, lasted 20 minutes, improved after resting, non radiating, non pleuritic, sharp. Patient denies SOB, f/c, cough, abd pain, N/V/D/C, weakness, HA, dizziness, urinary symptoms, extremity pain or swelling or other complaints.

## 2021-03-24 NOTE — ED PROVIDER NOTE - ATTENDING CONTRIBUTION TO CARE
41 y/o M with no significant PMH here with chest pain.  Pt reports intermittent episodes of chest pain over the past few weeks associated with high blood pressure.  Episodes usually substernal, nonradiating, no associated sob, nausea.  He endorses increased stressors at home recently and saw his PMD Dr Melgar earlier this week for these symptoms.  Pt had blood work done and is to follow-up for results.  This evening around 9pm pt reports being very busy between home schooling and working from home and running errands.  He went to the grocery store and while running up stairs carrying bags pt develop chest pain that spanned the lower part of his chest.  Pain lasted about 10-15 min and resolved on own.  He took his BP at the time and it was elevated, which concerned him prompting his ED visit.  [facility].  No fever, chills, cough, back pain, abd pain, vomitng.  No tob use.  No personal or familial cardiac hx.  Well appearing, lying comfortably in stretcher, awake and alert, nontoxic.  VSS.  Lungs cta bl.  Cards nl S1/S2, RRR, no MRG.  Abd soft ntnd.  No pedal edema or calf tenderness.  Plan for ekg, labs incld trop, cxr, asa, reassess - r/o acs.  Pt is low risk and stable for outpatient follow-up and provocative testing if labs are normal here.

## 2021-03-24 NOTE — ED PROVIDER NOTE - CLINICAL SUMMARY MEDICAL DECISION MAKING FREE TEXT BOX
40M non smoker presents with worsening angina this week assoc with high blood pressure, sys In the 150's. exam and ekg unremarkable.  pt unwilling to be admitted or cdu for stress, pcp able to set it up

## 2021-03-24 NOTE — ED ADULT NURSE NOTE - OBJECTIVE STATEMENT
Break coverage- Pt received into spot #12. AAOx4, c/o having an episode of midsternal chest pain radiating to back while going up the stairs. Pt states since then, pain has resolved on it's own. c/o having high blood pressure when checking it at home. Denies taking any blood pressure medications. Denies dizziness/lightheadedness. Denies sob. NSR on cardiac monitor. MD gardiner performed. #20g IV placed to right arm, labs drawn and sent. no acute distress. Awaiting further plan of care.

## 2021-03-24 NOTE — ED PROVIDER NOTE - NSFOLLOWUPINSTRUCTIONS_ED_ALL_ED_FT
Follow up with your cardiologist in 2-3 days for STRESS test, or call [phone] and arrange a follow up with our clinic, state you were seen in the emergency department and would like a rapid appointment.    - Return to the Emergency Room for any new or worsening symptoms.    - Please take 81mg of aspirin daily, continue if OK with your PMD

## 2021-03-24 NOTE — ED PROVIDER NOTE - PHYSICAL EXAMINATION
GEN: Well appearing, well nourished, in no apparent distress.  HEAD: NCAT  HEENT: PERRL, Airway patent, EOMI, non-erythematous pharynx, no exudates, uvula midline, MMM, neck supple, no LAD, no JVD  LUNG: CTAB, no adventitious sounds, no retractions, no nasal flaring  CV: RRR, no murmurs,   Abd: soft, NTND, no rebound or guarding, BS+ in all quadrants, no CVAT  MSK: WWP, Pulses 2+ in extremities, No edema   Neuro:  AAOx3, Ambulatory with stable gait. CHOUDHURY without laterality  Skin: Warm and dry, no evidence of rash  Psych: normal mood and affect

## 2021-05-27 ENCOUNTER — EMERGENCY (EMERGENCY)
Facility: HOSPITAL | Age: 41
LOS: 1 days | Discharge: HOME | End: 2021-05-27
Attending: EMERGENCY MEDICINE | Admitting: EMERGENCY MEDICINE
Payer: MEDICAID

## 2021-05-27 VITALS
TEMPERATURE: 98 F | DIASTOLIC BLOOD PRESSURE: 83 MMHG | SYSTOLIC BLOOD PRESSURE: 171 MMHG | RESPIRATION RATE: 17 BRPM | HEART RATE: 108 BPM | OXYGEN SATURATION: 99 % | HEIGHT: 66 IN

## 2021-05-27 VITALS
HEART RATE: 96 BPM | DIASTOLIC BLOOD PRESSURE: 104 MMHG | SYSTOLIC BLOOD PRESSURE: 165 MMHG | OXYGEN SATURATION: 100 % | RESPIRATION RATE: 20 BRPM

## 2021-05-27 LAB
ALBUMIN SERPL ELPH-MCNC: 4.5 G/DL — SIGNIFICANT CHANGE UP (ref 3.3–5)
ALP SERPL-CCNC: 65 U/L — SIGNIFICANT CHANGE UP (ref 40–120)
ALT FLD-CCNC: 24 U/L — SIGNIFICANT CHANGE UP (ref 4–41)
ANION GAP SERPL CALC-SCNC: 14 MMOL/L — SIGNIFICANT CHANGE UP (ref 7–14)
AST SERPL-CCNC: 26 U/L — SIGNIFICANT CHANGE UP (ref 4–40)
BASOPHILS # BLD AUTO: 0.03 K/UL — SIGNIFICANT CHANGE UP (ref 0–0.2)
BASOPHILS NFR BLD AUTO: 0.4 % — SIGNIFICANT CHANGE UP (ref 0–2)
BILIRUB SERPL-MCNC: <0.2 MG/DL — SIGNIFICANT CHANGE UP (ref 0.2–1.2)
BUN SERPL-MCNC: 16 MG/DL — SIGNIFICANT CHANGE UP (ref 7–23)
CALCIUM SERPL-MCNC: 9.8 MG/DL — SIGNIFICANT CHANGE UP (ref 8.4–10.5)
CHLORIDE SERPL-SCNC: 100 MMOL/L — SIGNIFICANT CHANGE UP (ref 98–107)
CO2 SERPL-SCNC: 24 MMOL/L — SIGNIFICANT CHANGE UP (ref 22–31)
CREAT SERPL-MCNC: 1.02 MG/DL — SIGNIFICANT CHANGE UP (ref 0.5–1.3)
EOSINOPHIL # BLD AUTO: 0.13 K/UL — SIGNIFICANT CHANGE UP (ref 0–0.5)
EOSINOPHIL NFR BLD AUTO: 1.7 % — SIGNIFICANT CHANGE UP (ref 0–6)
GLUCOSE SERPL-MCNC: 100 MG/DL — HIGH (ref 70–99)
HCT VFR BLD CALC: 43.7 % — SIGNIFICANT CHANGE UP (ref 39–50)
HGB BLD-MCNC: 13.1 G/DL — SIGNIFICANT CHANGE UP (ref 13–17)
IANC: 3.99 K/UL — SIGNIFICANT CHANGE UP (ref 1.5–8.5)
IMM GRANULOCYTES NFR BLD AUTO: 0.4 % — SIGNIFICANT CHANGE UP (ref 0–1.5)
LYMPHOCYTES # BLD AUTO: 2.85 K/UL — SIGNIFICANT CHANGE UP (ref 1–3.3)
LYMPHOCYTES # BLD AUTO: 37.3 % — SIGNIFICANT CHANGE UP (ref 13–44)
MCHC RBC-ENTMCNC: 24.3 PG — LOW (ref 27–34)
MCHC RBC-ENTMCNC: 30 GM/DL — LOW (ref 32–36)
MCV RBC AUTO: 81.2 FL — SIGNIFICANT CHANGE UP (ref 80–100)
MONOCYTES # BLD AUTO: 0.61 K/UL — SIGNIFICANT CHANGE UP (ref 0–0.9)
MONOCYTES NFR BLD AUTO: 8 % — SIGNIFICANT CHANGE UP (ref 2–14)
NEUTROPHILS # BLD AUTO: 3.99 K/UL — SIGNIFICANT CHANGE UP (ref 1.8–7.4)
NEUTROPHILS NFR BLD AUTO: 52.2 % — SIGNIFICANT CHANGE UP (ref 43–77)
NRBC # BLD: 0 /100 WBCS — SIGNIFICANT CHANGE UP
NRBC # FLD: 0 K/UL — SIGNIFICANT CHANGE UP
PLATELET # BLD AUTO: 196 K/UL — SIGNIFICANT CHANGE UP (ref 150–400)
POTASSIUM SERPL-MCNC: 3.8 MMOL/L — SIGNIFICANT CHANGE UP (ref 3.5–5.3)
POTASSIUM SERPL-SCNC: 3.8 MMOL/L — SIGNIFICANT CHANGE UP (ref 3.5–5.3)
PROT SERPL-MCNC: 7.9 G/DL — SIGNIFICANT CHANGE UP (ref 6–8.3)
RBC # BLD: 5.38 M/UL — SIGNIFICANT CHANGE UP (ref 4.2–5.8)
RBC # FLD: 15.2 % — HIGH (ref 10.3–14.5)
SODIUM SERPL-SCNC: 138 MMOL/L — SIGNIFICANT CHANGE UP (ref 135–145)
WBC # BLD: 7.64 K/UL — SIGNIFICANT CHANGE UP (ref 3.8–10.5)
WBC # FLD AUTO: 7.64 K/UL — SIGNIFICANT CHANGE UP (ref 3.8–10.5)

## 2021-05-27 PROCEDURE — 99284 EMERGENCY DEPT VISIT MOD MDM: CPT | Mod: 25

## 2021-05-27 PROCEDURE — 93010 ELECTROCARDIOGRAM REPORT: CPT

## 2021-05-27 RX ORDER — AMLODIPINE BESYLATE 2.5 MG/1
5 TABLET ORAL ONCE
Refills: 0 | Status: COMPLETED | OUTPATIENT
Start: 2021-05-27 | End: 2021-05-27

## 2021-05-27 RX ORDER — AMLODIPINE BESYLATE 2.5 MG/1
1 TABLET ORAL
Qty: 30 | Refills: 0
Start: 2021-05-27 | End: 2021-06-25

## 2021-05-27 RX ADMIN — AMLODIPINE BESYLATE 5 MILLIGRAM(S): 2.5 TABLET ORAL at 02:42

## 2021-05-27 NOTE — ED ADULT TRIAGE NOTE - CHIEF COMPLAINT QUOTE
pt arrives w/ c/o elevated BP. pt states at home it was 190/126. pt states he has tingling on the left side of head and left foot that began around midnight. pt arrives with popped blood vessel in left eye. pt states no hx of htn

## 2021-05-27 NOTE — ED PROVIDER NOTE - PHYSICAL EXAMINATION
Physical Exam:  Gen: NAD, AOx3, non-toxic appearing  Head: normal appearing  HEENT: normal conjunctiva, oral mucosa moist  Lung:  no respiratory distress, speaking in full sentences, clear to ascultation bilaterally     CV: regular rate and rhythm   Abd: soft, ND, NT  MSK: no visible deformities  Neuro: No focal deficits  Skin: Warm  Psych: normal affect  ~Bryan Alfaro D.O. -Resident

## 2021-05-27 NOTE — ED PROVIDER NOTE - NSFOLLOWUPINSTRUCTIONS_ED_ALL_ED_FT
Hypertension    Hypertension, commonly called high blood pressure, is when the force of blood pumping through your arteries is too strong. Hypertension forces your heart to work harder to pump blood. Your arteries may become narrow or stiff. Having untreated or uncontrolled hypertension for a long period of time can cause heart attack, stroke, kidney disease, and other problems. If started on a medication, take exactly as prescribed by your health care professional. Maintain a healthy lifestyle and follow up with your primary care physician.    SEEK IMMEDIATE MEDICAL CARE IF YOU HAVE ANY OF THE FOLLOWING SYMPTOMS: severe headache, confusion, chest pain, abdominal pain, vomiting, or shortness of breath.    Hypertension, Adult    High blood pressure (hypertension) is when the force of blood pumping through the arteries is too strong. The arteries are the blood vessels that carry blood from the heart throughout the body. Hypertension forces the heart to work harder to pump blood and may cause arteries to become narrow or stiff. Untreated or uncontrolled hypertension can cause a heart attack, heart failure, a stroke, kidney disease, and other problems.    A blood pressure reading consists of a higher number over a lower number. Ideally, your blood pressure should be below 120/80. The first ("top") number is called the systolic pressure. It is a measure of the pressure in your arteries as your heart beats. The second ("bottom") number is called the diastolic pressure. It is a measure of the pressure in your arteries as the heart relaxes.    What are the causes?    The exact cause of this condition is not known. There are some conditions that result in or are related to high blood pressure.    What increases the risk?  Some risk factors for high blood pressure are under your control.     The following factors may make you more likely to develop this condition:  Smoking.  Having type 2 diabetes mellitus, high cholesterol, or both.  Not getting enough exercise or physical activity.  Being overweight.  Having too much fat, sugar, calories, or salt (sodium) in your diet.  Drinking too much alcohol.  Some risk factors for high blood pressure may be difficult or impossible to change.     Some of these factors include:  Having chronic kidney disease.  Having a family history of high blood pressure.  Age. Risk increases with age.  Race. You may be at higher risk if you are   Gender. Men are at higher risk than women before age 45. After age 65, women are at higher risk than men.  Having obstructive sleep apnea.  Stress.    What are the signs or symptoms?  High blood pressure may not cause symptoms.   Very high blood pressure (hypertensive crisis) may cause:  Headache.  Anxiety.  Shortness of breath.  Nosebleed.  Nausea and vomiting.  Vision changes.  Severe chest pain.  Seizures.    How is this diagnosed?  This condition is diagnosed by measuring your blood pressure while you are seated, with your arm resting on a flat surface, your legs uncrossed, and your feet flat on the floor. The cuff of the blood pressure monitor will be placed directly against the skin of your upper arm at the level of your heart. It should be measured at least twice using the same arm. Certain conditions can cause a difference in blood pressure between your right and left arms.  Certain factors can cause blood pressure readings to be lower or higher than normal for a short period of time:    When your blood pressure is higher when you are in a health care provider's office than when you are at home, this is called white coat hypertension. Most people with this condition do not need medicines.    When your blood pressure is higher at home than when you are in a health care provider's office, this is called masked hypertension. Most people with this condition may need medicines to control blood pressure.    If you have a high blood pressure reading during one visit or you have normal blood pressure with other risk factors, you may be asked to:  Return on a different day to have your blood pressure checked again.  Monitor your blood pressure at home for 1 week or longer.    If you are diagnosed with hypertension, you may have other blood or imaging tests to help your health care provider understand your overall risk for other conditions.    How is this treated?  This condition is treated by making healthy lifestyle changes, such as eating healthy foods, exercising more, and reducing your alcohol intake.     Your health care provider may prescribe medicine if lifestyle changes are not enough to get your blood pressure under control, and if:    Your systolic blood pressure is above 130.  Your diastolic blood pressure is above 80.    Your personal target blood pressure may vary depending on your medical conditions, your age, and other factors.  Follow these instructions at home:    Eating and drinking     Eat a diet that is high in fiber and potassium, and low in sodium, added sugar, and fat. An example eating plan is called the DASH (Dietary Approaches to Stop Hypertension) diet. To eat this way:    Eat plenty of fresh fruits and vegetables. Try to fill one half of your plate at each meal with fruits and vegetables.Eat whole grains, such as whole-wheat pasta, brown rice, or whole-grain bread. Fill about one fourth of your plate with whole grains.    Eat or drink low-fat dairy products, such as skim milk or low-fat yogurt.Avoid fatty cuts of meat, processed or cured meats, and poultry with skin. Fill about one fourth of your plate with lean proteins, such as fish, chicken without skin, beans, eggs, or tofu    Avoid pre-made and processed foods. These tend to be higher in sodium, added sugar, and fat.    Reduce your daily sodium intake.     Most people with hypertension should eat less than 1,500 mg of sodium a day.Do not drink alcohol if:    Your health care provider tells you not to drink.You are pregnant, may be pregnant, or are planning to become pregnant.If you drink alcohol:    Limit how much you use to:  0–1 drink a day for women.0–2 drinks a day for men.  Be aware of how much alcohol is in your drink. In the [place] one drink equals one 12 oz bottle of beer (355 mL), one 5 oz glass of wine (148 mL), or one 1½ oz glass of hard liquor (44 mL).Lifestyle      Work with your health care provider to maintain a healthy body weight or to lose weight. Ask what an ideal weight is for you.Get at least 30 minutes of exercise most days of the week. Activities may include walking, swimming, or biking.Include exercise to strengthen your muscles (resistance exercise), such as Pilates or lifting weights, as part of your weekly exercise routine. Try to do these types of exercises for 30 minutes at least 3 days a week.Do not use any products that contain nicotine or tobacco, such as cigarettes, e-cigarettes, and chewing tobacco. If you need help quitting, ask your health care provider.Monitor your blood pressure at home as told by your health care provider.Keep all follow-up visits as told by your health care provider. This is important.Medicines     Take over-the-counter and prescription medicines only as told by your health care provider. Follow directions carefully. Blood pressure medicines must be taken as prescribed.Do not skip doses of blood pressure medicine. Doing this puts you at risk for problems and can make the medicine less effective.Ask your health care provider about side effects or reactions to medicines that you should watch for.    Contact a health care provider if you:  Think you are having a reaction to a medicine you are taking.Have headaches that keep coming back (recurring).Feel dizzy.Have swelling in your ankles.Have trouble with your vision.    Get help right away if you:  Develop a severe headache or confusion.Have unusual weakness or numbness.Feel faint.Have severe pain in your chest or abdomen.Vomit repeatedly.Have trouble breathing.    Summary  Hypertension is when the force of blood pumping through your arteries is too strong. If this condition is not controlled, it may put you at risk for serious complications.Your personal target blood pressure may vary depending on your medical conditions, your age, and other factors. For most people, a normal blood pressure is less than 120/80.Hypertension is treated with lifestyle changes, medicines, or a combination of both. Lifestyle changes include losing weight, eating a healthy, low-sodium diet, exercising more, and limiting alcohol. This information is not intended to replace advice given to you by your health care provider. Make sure you discuss any questions you have with your health care provider.    1. TAKE ALL MEDICATIONS AS DIRECTED.  We HAVE sent blood pressure medication to your pharmacy. make sure you take this medication every day.   2. FOR PAIN OR FEVER YOU CAN TAKE IBUPROFEN (MOTRIN, ADVIL) OR ACETAMINOPHEN (TYLENOL) AS NEEDED, AS DIRECTED ON PACKAGING.  3. FOLLOW UP WITH YOUR PRIMARY DOCTOR WITHIN 5 DAYS AS DIRECTED. Please make sure to discuss with your doctor being started on the blood pressure medication.   4. IF YOU HAD LABS OR IMAGING DONE, YOU WERE GIVEN COPIES OF ALL LABS AND/OR IMAGING RESULTS FROM YOUR ER VISIT--PLEASE TAKE THEM WITH YOU TO YOUR FOLLOW UP APPOINTMENTS.  5. RETURN TO THE ER FOR ANY WORSENING SYMPTOMS OR CONCERNS.

## 2021-05-27 NOTE — ED PROVIDER NOTE - NS ED ROS FT

## 2021-05-27 NOTE — ED PROVIDER NOTE - CLINICAL SUMMARY MEDICAL DECISION MAKING FREE TEXT BOX
41yo m no pmhx, obese pw sx of mild headache that gradually worsening throughout the day. likely htn related sx, will eval for end organ dx, start antihypertensives, dc with pmd Follow up for titration if all unactionable. patient agreeable to plan, no sx at present no other red flags for concerning primary neurologic event

## 2021-05-27 NOTE — ED PROVIDER NOTE - OBJECTIVE STATEMENT
39yo m no pmhx, obese pw sx of mild headache that gradually worsening throughout the day. reports having been measuring bp and found to be gradually increasing throughout the evening as well as possibly some tingling in his left bicep region. reports sx went away when going to sleep but he measured his bp and it was 190sbp so he was concerned so he came to the ed for eval. now Denies recent trauma, fevers, chills, headache, dizziness, nausea, vomiting, dysuria, freq, hematuria, diarrhea, constipation, chest pain, shortness of breath, cough.

## 2021-05-27 NOTE — ED ADULT NURSE NOTE - OBJECTIVE STATEMENT
Pt A&OX4, ambulatory, in NAD. Arrives to ED c/o high blood pressure. Pt states he was feeling off tonight and had dizziness, anxiety, numbness to L arm that has resolved so he took his BP which he noticed was high so he came to the ED. Pt denies hx HTN. Pt endorses anxiety/stress, dizziness, upper abdominal pain. Denies CP, blurry vision, cough, SOB, fevers/chills, N/V/D, flank pain, ZHOU. L. eye noted to be bright red, as per Pt this happens sometimes due to "growth in eye." NSR on cardiac monitor. 100% RA. Awaiting MD gardiner. Pt A&OX4, ambulatory, in NAD. Arrives to ED c/o high blood pressure. Pt states he was feeling off tonight and had dizziness, anxiety, numbness to L arm that has resolved so he took his BP which he noticed was high so he came to the ED. Pt denies hx HTN. Pt endorses anxiety/stress, dizziness, upper abdominal pain. Denies CP, blurry vision, cough, SOB, fevers/chills, N/V/D, flank pain, ZHOU. L. eye noted to be bright red, as per Pt this happens sometimes due to "growth in eye." No facial droop noted.  strength equal bilaterally. No drift noted to upper/lower extremities. NSR on cardiac monitor. 100% RA. Awaiting MD gardiner.

## 2021-05-27 NOTE — ED PROVIDER NOTE - PATIENT PORTAL LINK FT
You can access the FollowMyHealth Patient Portal offered by [facility] by registering at the following website: [email or web address]. By joining [employer]’s FollowMyHealth portal, you will also be able to view your health information using other applications (apps) compatible with our system.

## 2021-05-27 NOTE — ED PROVIDER NOTE - ATTENDING CONTRIBUTION TO CARE
41 y/o M with h/o obesity here with high blood pressure.  Pt reports he was feeling lightheaded during the day.  After getting home he decided to check his blood pressure and it was running high prompting him to come for evaluation.  Currently pt denies fever, ha, vision changes, cp, sob, abd pain, n/v, weakness.  He reports he was working and outside a lot during the day in the heat and was not drinking a lot of water.  No reported hx of diagnosed htn.  Well appearing, lying comfortably in stretcher, awake and alert, nontoxic.  AF/VSS.  NCAT EOMI PERRL (pterygium to left eye).  Lungs cta bl.  Cards nl S1/S2, RRR, no MRG.  Abd soft ntnd.  No focal neuro deficits.  Plan to check ekg, labs incl renal function.  No s/s concerning for hypertensive emergency and pt is well appearing.  Will screen for underlying metabolic disturbance, cardiac ischemia, outpatient pmd f/u for further monitoring of bp.

## 2021-07-01 ENCOUNTER — OUTPATIENT (OUTPATIENT)
Dept: OUTPATIENT SERVICES | Facility: HOSPITAL | Age: 41
LOS: 1 days | End: 2021-07-01
Payer: MEDICAID

## 2021-07-13 DIAGNOSIS — Z71.89 OTHER SPECIFIED COUNSELING: ICD-10-CM

## 2021-08-13 ENCOUNTER — APPOINTMENT (OUTPATIENT)
Dept: INTERNAL MEDICINE | Facility: CLINIC | Age: 41
End: 2021-08-13
Payer: MEDICAID

## 2021-08-13 VITALS
SYSTOLIC BLOOD PRESSURE: 158 MMHG | OXYGEN SATURATION: 100 % | HEART RATE: 104 BPM | BODY MASS INDEX: 41.44 KG/M2 | HEIGHT: 67 IN | DIASTOLIC BLOOD PRESSURE: 100 MMHG | WEIGHT: 264 LBS | TEMPERATURE: 97.9 F

## 2021-08-13 VITALS — SYSTOLIC BLOOD PRESSURE: 150 MMHG | DIASTOLIC BLOOD PRESSURE: 84 MMHG

## 2021-08-13 DIAGNOSIS — F43.9 REACTION TO SEVERE STRESS, UNSPECIFIED: ICD-10-CM

## 2021-08-13 DIAGNOSIS — R94.31 ABNORMAL ELECTROCARDIOGRAM [ECG] [EKG]: ICD-10-CM

## 2021-08-13 PROCEDURE — 99203 OFFICE O/P NEW LOW 30 MIN: CPT

## 2021-08-13 NOTE — HEALTH RISK ASSESSMENT
[Fair] : ~his/her~ current health as fair  [Good] : ~his/her~  mood as  good [Intercurrent Urgi Care visits] : went to urgent care [No] : In the past 12 months have you used drugs other than those required for medical reasons? No [No falls in past year] : Patient reported no falls in the past year [0] : 2) Feeling down, depressed, or hopeless: Not at all (0) [None] : None [With Family] : lives with family [Employed] : employed [College] : College [] :  [# Of Children ___] : has [unfilled] children [Sexually Active] : sexually active [Feels Safe at Home] : Feels safe at home [Fully functional (bathing, dressing, toileting, transferring, walking, feeding)] : Fully functional (bathing, dressing, toileting, transferring, walking, feeding) [Fully functional (using the telephone, shopping, preparing meals, housekeeping, doing laundry, using] : Fully functional and needs no help or supervision to perform IADLs (using the telephone, shopping, preparing meals, housekeeping, doing laundry, using transportation, managing medications and managing finances) [Reports changes in vision] : Reports changes in vision [Reports changes in dental health] : Reports changes in dental health [Smoke Detector] : smoke detector [Carbon Monoxide Detector] : carbon monoxide detector [Safety elements used in home] : safety elements used in home [Seat Belt] :  uses seat belt [Sunscreen] : uses sunscreen [] : No [de-identified] : 05/01/2021 [de-identified] : pcp Dr. Melgar [de-identified] : Doing exercises every other day, cardiuo, walking  stationary bike  [de-identified] : Healthy  [Change in mental status noted] : No change in mental status noted [Language] : denies difficulty with language [Behavior] : denies difficulty with behavior [Learning/Retaining New Information] : denies difficulty learning/retaining new information [Handling Complex Tasks] : denies difficulty handling complex tasks [Reasoning] : denies difficulty with reasoning [Spatial Ability and Orientation] : denies difficulty with spatial ability and orientation [High Risk Behavior] : no high risk behavior [Reports changes in hearing] : Reports no changes in hearing [Travel to Developing Areas] : does not  travel to developing areas [TB Exposure] : is not being exposed to tuberculosis [Caregiver Concerns] : does not have caregiver concerns [HIVDate] : 01/2017 [HIVComments] : NEGATIVE [HepatitisCDate] : 01/2017 [HepatitisCComments] : NEGATIVE  [de-identified] : WIFE AND TWO CHILDREN [de-identified] : SELF-EMPLOYED  [FreeTextEntry2] : C [de-identified] : PATIENT REPORTS "GROWTH IN LEFT EYE" [de-identified] : PATIENT NEEDS AN EXTRACTION AND IMPLANT

## 2021-08-13 NOTE — HISTORY OF PRESENT ILLNESS
[FreeTextEntry1] : establish care [de-identified] : establish care\par PMHx HTN on amlodipine 10 mg\par lost weight with diet and exercise\par \par

## 2021-08-13 NOTE — ASSESSMENT
[FreeTextEntry1] : HTN- cont amlodipine\par states that he is nerbvous and that his pressure readings at home are better\par \par EKG- done in ER showed incomplete RBBB \par \par stress - will be seeing a therapist next week\par

## 2021-09-24 ENCOUNTER — APPOINTMENT (OUTPATIENT)
Dept: INTERNAL MEDICINE | Facility: CLINIC | Age: 41
End: 2021-09-24

## 2021-10-18 ENCOUNTER — NON-APPOINTMENT (OUTPATIENT)
Age: 41
End: 2021-10-18

## 2021-10-18 ENCOUNTER — APPOINTMENT (OUTPATIENT)
Dept: CARDIOLOGY | Facility: CLINIC | Age: 41
End: 2021-10-18
Payer: MEDICAID

## 2021-10-18 VITALS
BODY MASS INDEX: 40.49 KG/M2 | HEIGHT: 67 IN | HEART RATE: 103 BPM | WEIGHT: 258 LBS | OXYGEN SATURATION: 99 % | TEMPERATURE: 97.7 F

## 2021-10-18 VITALS — SYSTOLIC BLOOD PRESSURE: 148 MMHG | DIASTOLIC BLOOD PRESSURE: 80 MMHG

## 2021-10-18 VITALS — DIASTOLIC BLOOD PRESSURE: 80 MMHG | SYSTOLIC BLOOD PRESSURE: 150 MMHG

## 2021-10-18 DIAGNOSIS — K64.9 UNSPECIFIED HEMORRHOIDS: ICD-10-CM

## 2021-10-18 DIAGNOSIS — Z84.0 FAMILY HISTORY OF DISEASES OF THE SKIN AND SUBCUTANEOUS TISSUE: ICD-10-CM

## 2021-10-18 DIAGNOSIS — Z78.9 OTHER SPECIFIED HEALTH STATUS: ICD-10-CM

## 2021-10-18 DIAGNOSIS — Z82.49 FAMILY HISTORY OF ISCHEMIC HEART DISEASE AND OTHER DISEASES OF THE CIRCULATORY SYSTEM: ICD-10-CM

## 2021-10-18 PROCEDURE — 93000 ELECTROCARDIOGRAM COMPLETE: CPT

## 2021-10-18 PROCEDURE — 99204 OFFICE O/P NEW MOD 45 MIN: CPT | Mod: 25

## 2021-10-23 NOTE — HISTORY OF PRESENT ILLNESS
[FreeTextEntry1] : 41-year-old  gentleman was referred by Dr. Feldman because of high blood pressure.\par \par He was in his usual state of health until last year when his brother  from illness during the Covid pandemic.  He was also very stressed.  He was feeling lightheaded and checked his blood pressure at home and noted it was 150/101.  He went to the emergency room at [facility] in [place].  BP there was 160/101.  Was given 5 mg of amlodipine and sent home.\par \par Subsequently he saw a family doctor Dr. Bere Melgar in [place].  Owing to difficulty in traveling he switched over to Dr. Feldman.  He noted BP to be 150/84.  Apparently blood pressure at home was low and therefore he did not change medication.  EKG done in ER showed sinus rhythm with incomplete right bundle branch block.  He was also going to follow-up with therapist for stress and hence no changes were made.  He was referred for cardiac evaluation.\par \par In March of this year he started cardiovascular exercise with walking and also did start doing resistance training and change his eating habits and since then has lost about 30 pounds.\par \par His sleep is disturbed.  He does snore.  Sleep is not refreshing.  He stated that ever since she started taking amlodipine during the day he gets tired especially if he has not had a good night sleep.  Apparently the snoring and disturbance of sleep has improved somewhat after he lost 30 pounds.  He has never been evaluated for sleep apnea.

## 2021-10-23 NOTE — PHYSICAL EXAM
[No Acute Distress] : no acute distress [Obese] : obese [Normal Conjunctiva] : normal conjunctiva [Normal Venous Pressure] : normal venous pressure [No Carotid Bruit] : no carotid bruit [Normal S1, S2] : normal S1, S2 [No Rub] : no rub [No Gallop] : no gallop [Murmur] : murmur [Clear Lung Fields] : clear lung fields [Good Air Entry] : good air entry [No Respiratory Distress] : no respiratory distress  [Soft] : abdomen soft [Non Tender] : non-tender [No Masses/organomegaly] : no masses/organomegaly [Normal Bowel Sounds] : normal bowel sounds [Normal Gait] : normal gait [No Edema] : no edema [No Cyanosis] : no cyanosis [No Clubbing] : no clubbing [No Varicosities] : no varicosities [No Rash] : no rash [No Skin Lesions] : no skin lesions [Moves all extremities] : moves all extremities [No Focal Deficits] : no focal deficits [Normal Speech] : normal speech [Alert and Oriented] : alert and oriented [Normal memory] : normal memory [de-identified] : 1/6 PIPER

## 2021-10-23 NOTE — ASSESSMENT
[FreeTextEntry1] : His blood pressure is not well controlled.  Unless otherwise proved he has sleep apnea.  That may be one reason why his pressure is up and not coming down.  He has history suggestive of sleep apnea.

## 2021-10-23 NOTE — DISCUSSION/SUMMARY
Recommended observation. [FreeTextEntry1] : For better control of blood pressure I gave him low-salt diet and started hydrochlorothiazide 12.5 mg daily.  Fasting blood tests were ordered.  He was also given diet for low-salt weight loss and recommendations for exercise.  A referral was also given to see pulmonologist to get evaluated for sleep apnea.\par \par If all attempts at losing and maintaining weight at a proper level fail then he may have to consider possibility of bariatric surgery.  He seems to be very motivated to do what ever it takes to lose weight and remain healthy.

## 2021-11-01 ENCOUNTER — APPOINTMENT (OUTPATIENT)
Dept: PULMONOLOGY | Facility: CLINIC | Age: 41
End: 2021-11-01

## 2021-11-04 ENCOUNTER — NON-APPOINTMENT (OUTPATIENT)
Age: 41
End: 2021-11-04

## 2021-11-04 RX ORDER — HYDROCHLOROTHIAZIDE 12.5 MG/1
12.5 TABLET ORAL DAILY
Qty: 90 | Refills: 1 | Status: DISCONTINUED | COMMUNITY
Start: 2021-10-18 | End: 2021-11-04

## 2021-12-01 PROCEDURE — G9005: CPT

## 2021-12-22 ENCOUNTER — APPOINTMENT (OUTPATIENT)
Dept: OPHTHALMOLOGY | Facility: CLINIC | Age: 41
End: 2021-12-22

## 2022-02-07 ENCOUNTER — APPOINTMENT (OUTPATIENT)
Dept: OPHTHALMOLOGY | Facility: CLINIC | Age: 42
End: 2022-02-07

## 2022-03-21 ENCOUNTER — APPOINTMENT (OUTPATIENT)
Dept: OPHTHALMOLOGY | Facility: CLINIC | Age: 42
End: 2022-03-21

## 2022-03-29 ENCOUNTER — APPOINTMENT (OUTPATIENT)
Dept: OPHTHALMOLOGY | Facility: CLINIC | Age: 42
End: 2022-03-29
Payer: MEDICAID

## 2022-03-29 ENCOUNTER — NON-APPOINTMENT (OUTPATIENT)
Age: 42
End: 2022-03-29

## 2022-03-29 PROCEDURE — 92025 CPTRIZED CORNEAL TOPOGRAPHY: CPT

## 2022-03-29 PROCEDURE — 92285 EXTERNAL OCULAR PHOTOGRAPHY: CPT

## 2022-03-29 PROCEDURE — 92250 FUNDUS PHOTOGRAPHY W/I&R: CPT

## 2022-03-29 PROCEDURE — 99204 OFFICE O/P NEW MOD 45 MIN: CPT

## 2022-05-07 ENCOUNTER — APPOINTMENT (OUTPATIENT)
Dept: OPHTHALMOLOGY | Facility: CLINIC | Age: 42
End: 2022-05-07

## 2022-05-12 ENCOUNTER — APPOINTMENT (OUTPATIENT)
Dept: OPHTHALMOLOGY | Facility: CLINIC | Age: 42
End: 2022-05-12

## 2022-05-20 ENCOUNTER — NON-APPOINTMENT (OUTPATIENT)
Age: 42
End: 2022-05-20

## 2022-05-20 ENCOUNTER — APPOINTMENT (OUTPATIENT)
Dept: OPHTHALMOLOGY | Facility: EYE CENTER | Age: 42
End: 2022-05-20

## 2022-05-20 ENCOUNTER — APPOINTMENT (OUTPATIENT)
Dept: ORTHOPEDIC SURGERY | Facility: CLINIC | Age: 42
End: 2022-05-20
Payer: MEDICAID

## 2022-05-20 VITALS — HEIGHT: 66 IN | WEIGHT: 241 LBS | BODY MASS INDEX: 38.73 KG/M2

## 2022-05-20 DIAGNOSIS — S89.92XA UNSPECIFIED INJURY OF LEFT LOWER LEG, INITIAL ENCOUNTER: ICD-10-CM

## 2022-05-20 PROCEDURE — 99204 OFFICE O/P NEW MOD 45 MIN: CPT

## 2022-05-20 PROCEDURE — 73564 X-RAY EXAM KNEE 4 OR MORE: CPT | Mod: LT

## 2022-05-20 RX ORDER — DICLOFENAC SODIUM 1% 10 MG/G
1 GEL TOPICAL
Qty: 100 | Refills: 0 | Status: ACTIVE | COMMUNITY
Start: 2022-05-20 | End: 1900-01-01

## 2022-05-21 ENCOUNTER — APPOINTMENT (OUTPATIENT)
Dept: OPHTHALMOLOGY | Facility: CLINIC | Age: 42
End: 2022-05-21

## 2022-05-25 NOTE — ED ADULT NURSE NOTE - NSSEPSISNEWALTERMENTAL_ED_A_ED
"Demetrice Strickland NP  VASCULAR SURGERY                   " No

## 2022-06-04 ENCOUNTER — APPOINTMENT (OUTPATIENT)
Dept: OPHTHALMOLOGY | Facility: CLINIC | Age: 42
End: 2022-06-04

## 2022-06-07 ENCOUNTER — APPOINTMENT (OUTPATIENT)
Dept: OPHTHALMOLOGY | Facility: CLINIC | Age: 42
End: 2022-06-07

## 2022-06-17 ENCOUNTER — APPOINTMENT (OUTPATIENT)
Dept: OPHTHALMOLOGY | Facility: EYE CENTER | Age: 42
End: 2022-06-17

## 2022-06-18 ENCOUNTER — APPOINTMENT (OUTPATIENT)
Dept: OPHTHALMOLOGY | Facility: CLINIC | Age: 42
End: 2022-06-18

## 2022-06-20 ENCOUNTER — APPOINTMENT (OUTPATIENT)
Dept: INTERNAL MEDICINE | Facility: CLINIC | Age: 42
End: 2022-06-20
Payer: MEDICAID

## 2022-06-20 VITALS
SYSTOLIC BLOOD PRESSURE: 127 MMHG | HEART RATE: 79 BPM | TEMPERATURE: 97.6 F | OXYGEN SATURATION: 100 % | WEIGHT: 253 LBS | BODY MASS INDEX: 40.66 KG/M2 | HEIGHT: 66 IN | DIASTOLIC BLOOD PRESSURE: 76 MMHG

## 2022-06-20 PROCEDURE — 99213 OFFICE O/P EST LOW 20 MIN: CPT

## 2022-06-20 NOTE — HISTORY OF PRESENT ILLNESS
[FreeTextEntry1] : follow up [de-identified] : follow up\par \par HTn- now looks well controlled on amlodipine\par

## 2022-06-20 NOTE — ASSESSMENT
[FreeTextEntry1] : HTn- now looks well controlled on amlodipine\par \par \par LIANET- will refer to pulm\par \par \par Blood work drawn in office today\par

## 2022-06-20 NOTE — HEALTH RISK ASSESSMENT
[Never] : Never [No] : In the past 12 months have you used drugs other than those required for medical reasons? No [No falls in past year] : Patient reported no falls in the past year [0] : 2) Feeling down, depressed, or hopeless: Not at all (0) [Audit-CScore] : 0 [de-identified] : gym exercise [de-identified] : fair [[id number]] : 0

## 2022-06-21 LAB
25(OH)D3 SERPL-MCNC: 37.6 NG/ML
ALBUMIN SERPL ELPH-MCNC: 4.7 G/DL
ALP BLD-CCNC: 66 U/L
ALT SERPL-CCNC: 21 U/L
ANION GAP SERPL CALC-SCNC: 14 MMOL/L
AST SERPL-CCNC: 27 U/L
BASOPHILS # BLD AUTO: 0.05 K/UL
BASOPHILS NFR BLD AUTO: 0.9 %
BILIRUB SERPL-MCNC: 0.2 MG/DL
BUN SERPL-MCNC: 14 MG/DL
CALCIUM SERPL-MCNC: 10 MG/DL
CHLORIDE SERPL-SCNC: 102 MMOL/L
CHOLEST SERPL-MCNC: 277 MG/DL
CO2 SERPL-SCNC: 24 MMOL/L
CREAT SERPL-MCNC: 1.02 MG/DL
EGFR: 94 ML/MIN/1.73M2
EOSINOPHIL # BLD AUTO: 0.09 K/UL
EOSINOPHIL NFR BLD AUTO: 1.7 %
ESTIMATED AVERAGE GLUCOSE: 117 MG/DL
FOLATE SERPL-MCNC: 14.4 NG/ML
GLUCOSE SERPL-MCNC: 87 MG/DL
HBA1C MFR BLD HPLC: 5.7 %
HCT VFR BLD CALC: 46.2 %
HDLC SERPL-MCNC: 63 MG/DL
HGB BLD-MCNC: 13.7 G/DL
IMM GRANULOCYTES NFR BLD AUTO: 0.6 %
LDLC SERPL CALC-MCNC: 189 MG/DL
LYMPHOCYTES # BLD AUTO: 2.05 K/UL
LYMPHOCYTES NFR BLD AUTO: 37.6 %
MAN DIFF?: NORMAL
MCHC RBC-ENTMCNC: 24.8 PG
MCHC RBC-ENTMCNC: 29.7 GM/DL
MCV RBC AUTO: 83.7 FL
MONOCYTES # BLD AUTO: 0.43 K/UL
MONOCYTES NFR BLD AUTO: 7.9 %
NEUTROPHILS # BLD AUTO: 2.8 K/UL
NEUTROPHILS NFR BLD AUTO: 51.3 %
NONHDLC SERPL-MCNC: 214 MG/DL
PLATELET # BLD AUTO: 165 K/UL
POTASSIUM SERPL-SCNC: 5.2 MMOL/L
PROT SERPL-MCNC: 7.6 G/DL
RBC # BLD: 5.52 M/UL
RBC # FLD: 15.7 %
SODIUM SERPL-SCNC: 141 MMOL/L
TRIGL SERPL-MCNC: 127 MG/DL
TSH SERPL-ACNC: 1.75 UIU/ML
VIT B12 SERPL-MCNC: 610 PG/ML
WBC # FLD AUTO: 5.45 K/UL

## 2022-06-28 ENCOUNTER — APPOINTMENT (OUTPATIENT)
Dept: PULMONOLOGY | Facility: CLINIC | Age: 42
End: 2022-06-28

## 2022-08-25 ENCOUNTER — APPOINTMENT (OUTPATIENT)
Dept: PULMONOLOGY | Facility: CLINIC | Age: 42
End: 2022-08-25

## 2022-08-25 VITALS — HEART RATE: 72 BPM | DIASTOLIC BLOOD PRESSURE: 77 MMHG | SYSTOLIC BLOOD PRESSURE: 140 MMHG | OXYGEN SATURATION: 97 %

## 2022-08-25 VITALS — BODY MASS INDEX: 39.08 KG/M2 | WEIGHT: 249 LBS | HEIGHT: 67 IN

## 2022-08-25 DIAGNOSIS — Z86.79 PERSONAL HISTORY OF OTHER DISEASES OF THE CIRCULATORY SYSTEM: ICD-10-CM

## 2022-08-25 DIAGNOSIS — R29.818 OTHER SYMPTOMS AND SIGNS INVOLVING THE NERVOUS SYSTEM: ICD-10-CM

## 2022-08-25 PROCEDURE — 99204 OFFICE O/P NEW MOD 45 MIN: CPT

## 2022-08-25 NOTE — CONSULT LETTER
[Dear  ___] : Dear  [unfilled], [Courtesy Letter:] : I had the pleasure of seeing your patient, [unfilled], in my office today. [Please see my note below.] : Please see my note below. [Consult Closing:] : Thank you very much for allowing me to participate in the care of this patient.  If you have any questions, please do not hesitate to contact me. [Sincerely,] : Sincerely, [FreeTextEntry3] : Minerva Nichols D.O.\par [facility]\par [phone]\par [email or web address]\par

## 2022-08-25 NOTE — HISTORY OF PRESENT ILLNESS
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 short sol. nocturia, nocturnal awakenings. starts his day 6am \par lots of coffee \par no sleep aides\par + snoring. +  apneas or gasping for air. no dream recall. + morning headaches. no dry mouth.\par no parasomnias. + clench jaw. no driving when fatigue\par  \par \par

## 2022-08-30 ENCOUNTER — APPOINTMENT (OUTPATIENT)
Dept: DERMATOLOGY | Facility: CLINIC | Age: 42
End: 2022-08-30

## 2022-08-30 ENCOUNTER — NON-APPOINTMENT (OUTPATIENT)
Age: 42
End: 2022-08-30

## 2022-08-30 VITALS — BODY MASS INDEX: 39.08 KG/M2 | HEIGHT: 67 IN | WEIGHT: 249 LBS

## 2022-08-30 DIAGNOSIS — L73.0 ACNE KELOID: ICD-10-CM

## 2022-08-30 PROCEDURE — 99204 OFFICE O/P NEW MOD 45 MIN: CPT

## 2022-08-30 RX ORDER — TRETINOIN 0.5 MG/G
0.05 CREAM TOPICAL
Qty: 45 | Refills: 5 | Status: ACTIVE | COMMUNITY
Start: 2022-08-30 | End: 1900-01-01

## 2022-08-30 NOTE — PHYSICAL EXAM
[FreeTextEntry3] : Gen: well appearing man in NAD, alert, pleasant\par Skin: Focused exam:\par Posterior lower occipital scalp with many firm hyperpigmented papules and plaques; minimal erythema

## 2022-08-30 NOTE — ASSESSMENT
[FreeTextEntry1] : 1. Acne keloidalis nuchae, chronic, lower occipital scalp, not at treatment goal\par - education, counseling. Discussed diagnosis and etiology\par - avoid shaving close/using razors. Try to keep a few millimeters of hair\par - start benzoyl peroxide 4% wash otc, SED, wash off completely\par - start clindamycin lotion BID\par - start triamcinolone 0.1% ointment BID x 2 weeks on, 2 weeks off, SED, not for face/groin/axilla\par - will consider ILK injections at next visit. Also discussed that surgical excision of the area can be a more definitive/semipermanent treatment option\par \par RTC 6-8 weeks

## 2022-08-30 NOTE — HISTORY OF PRESENT ILLNESS
[FreeTextEntry1] : bumps on scalp [de-identified] : Aug 30 2022  4:00PM \par \par 42 year M new patient here for evaluation of bumps on back of scalp x 6 months. [place] friend also got a haircut there and had similar, but worse bumps. Never had this before, can be itchy at times. Using "terrasil" from [employer] without improvement. \par \par ROS: denies f/c, no other skin complaints\par Allergies: NKDA\par PMH: HTN\par Meds: amlodipine\par

## 2022-09-30 ENCOUNTER — APPOINTMENT (OUTPATIENT)
Dept: INTERNAL MEDICINE | Facility: CLINIC | Age: 42
End: 2022-09-30

## 2022-09-30 VITALS
HEART RATE: 81 BPM | BODY MASS INDEX: 38.92 KG/M2 | HEIGHT: 67 IN | WEIGHT: 248 LBS | SYSTOLIC BLOOD PRESSURE: 142 MMHG | OXYGEN SATURATION: 99 % | TEMPERATURE: 97.2 F | DIASTOLIC BLOOD PRESSURE: 95 MMHG

## 2022-09-30 PROCEDURE — 99214 OFFICE O/P EST MOD 30 MIN: CPT

## 2022-09-30 NOTE — ASSESSMENT
[FreeTextEntry1] : follow up\par \par HTN- bp reading initially high in office\par when checked earlier this week was 116/70 and was happy about \par did not sleep \par daughters were sick all night \par \par will have him come back in 3 weeks to re access BP and have a log ready to interpret\par \par \par

## 2022-09-30 NOTE — HISTORY OF PRESENT ILLNESS
[FreeTextEntry1] : follow up [de-identified] : follow up\par \par HTN- bp reading initially high in office\par when checked earlier this week was 116/70 and was happy about \par did not sleep \par daughters were sick all night

## 2022-09-30 NOTE — HEALTH RISK ASSESSMENT
[Never] : Never [Never (0 pts)] : Never (0 points) [No] : In the past 12 months have you used drugs other than those required for medical reasons? No [No falls in past year] : Patient reported no falls in the past year [0] : 2) Feeling down, depressed, or hopeless: Not at all (0) [de-identified] : No [de-identified] : Dermatologist Dr Gilliam   [de-identified] : gym  [[id number]] : 0

## 2022-10-06 ENCOUNTER — APPOINTMENT (OUTPATIENT)
Dept: PULMONOLOGY | Facility: CLINIC | Age: 42
End: 2022-10-06

## 2022-10-06 PROCEDURE — 95800 SLP STDY UNATTENDED: CPT

## 2022-10-08 PROCEDURE — 95800 SLP STDY UNATTENDED: CPT

## 2022-10-13 ENCOUNTER — APPOINTMENT (OUTPATIENT)
Dept: DERMATOLOGY | Facility: CLINIC | Age: 42
End: 2022-10-13

## 2022-10-27 ENCOUNTER — APPOINTMENT (OUTPATIENT)
Dept: PULMONOLOGY | Facility: CLINIC | Age: 42
End: 2022-10-27

## 2022-10-27 PROCEDURE — 99214 OFFICE O/P EST MOD 30 MIN: CPT | Mod: 95

## 2022-10-27 NOTE — PHYSICAL EXAM
[No Acute Distress] : no acute distress [Well Nourished] : well nourished [Well Developed] : well developed [IV] : Mallampati Class: IV [No Resp Distress] : no resp distress [No Acc Muscle Use] : no acc muscle use [Clear to Auscultation Bilaterally] : clear to auscultation bilaterally [No Focal Deficits] : no focal deficits [Oriented x3] : oriented x3 [Normal Affect] : normal affect [TextBox_44] : NC> 17

## 2022-10-27 NOTE — HISTORY OF PRESENT ILLNESS
[Never] : never [TextBox_4] : This visit was provided via telehealth using real-time 2-way audio visual technology. The patient,  LENA RAMOS , was located at home, [address] [place] [address]\[place] [address] at the time of the visit. \par The provider, Kirill Nichols, was located at office at the time of the visit. \par The patient, Mr. LENA RAMOS and Physician Kirill Hansen participated in the telehealth encounter. \par Verbal consent obtained by  from patient \par \par \par LENA RAMOS is a 42 year old male who presents for f/u on HST\par \par no toher changes\par

## 2022-10-27 NOTE — ASSESSMENT
[FreeTextEntry1] : mild rodger on hst\par Treatment options discussed includin) using CPAP, 2) weight loss, 3) dental devices, 4) surgery 5) alleviating nasal obstruction 6) avoid alcohol, sedatives within a few hours of sleep.\par \par trial of autocpap\par f/u after use\par

## 2022-12-19 ENCOUNTER — RX RENEWAL (OUTPATIENT)
Age: 42
End: 2022-12-19

## 2023-02-01 ENCOUNTER — EMERGENCY (EMERGENCY)
Facility: HOSPITAL | Age: 43
LOS: 1 days | Discharge: HOME | End: 2023-02-01
Attending: EMERGENCY MEDICINE | Admitting: EMERGENCY MEDICINE
Payer: MEDICAID

## 2023-02-01 VITALS
SYSTOLIC BLOOD PRESSURE: 121 MMHG | RESPIRATION RATE: 17 BRPM | OXYGEN SATURATION: 100 % | DIASTOLIC BLOOD PRESSURE: 75 MMHG | HEART RATE: 120 BPM | TEMPERATURE: 103 F

## 2023-02-01 VITALS
TEMPERATURE: 99 F | DIASTOLIC BLOOD PRESSURE: 60 MMHG | RESPIRATION RATE: 17 BRPM | OXYGEN SATURATION: 99 % | SYSTOLIC BLOOD PRESSURE: 112 MMHG | HEART RATE: 98 BPM

## 2023-02-01 LAB
ALBUMIN SERPL ELPH-MCNC: 4.2 G/DL — SIGNIFICANT CHANGE UP (ref 3.3–5)
ALP SERPL-CCNC: 62 U/L — SIGNIFICANT CHANGE UP (ref 40–120)
ALT FLD-CCNC: 23 U/L — SIGNIFICANT CHANGE UP (ref 4–41)
ANION GAP SERPL CALC-SCNC: 10 MMOL/L — SIGNIFICANT CHANGE UP (ref 7–14)
AST SERPL-CCNC: 24 U/L — SIGNIFICANT CHANGE UP (ref 4–40)
B PERT DNA SPEC QL NAA+PROBE: SIGNIFICANT CHANGE UP
B PERT+PARAPERT DNA PNL SPEC NAA+PROBE: SIGNIFICANT CHANGE UP
BASOPHILS # BLD AUTO: 0.04 K/UL — SIGNIFICANT CHANGE UP (ref 0–0.2)
BASOPHILS NFR BLD AUTO: 0.3 % — SIGNIFICANT CHANGE UP (ref 0–2)
BILIRUB SERPL-MCNC: 0.4 MG/DL — SIGNIFICANT CHANGE UP (ref 0.2–1.2)
BORDETELLA PARAPERTUSSIS (RAPRVP): SIGNIFICANT CHANGE UP
BUN SERPL-MCNC: 15 MG/DL — SIGNIFICANT CHANGE UP (ref 7–23)
C PNEUM DNA SPEC QL NAA+PROBE: SIGNIFICANT CHANGE UP
CALCIUM SERPL-MCNC: 9.3 MG/DL — SIGNIFICANT CHANGE UP (ref 8.4–10.5)
CHLORIDE SERPL-SCNC: 98 MMOL/L — SIGNIFICANT CHANGE UP (ref 98–107)
CO2 SERPL-SCNC: 24 MMOL/L — SIGNIFICANT CHANGE UP (ref 22–31)
CREAT SERPL-MCNC: 1.27 MG/DL — SIGNIFICANT CHANGE UP (ref 0.5–1.3)
EGFR: 72 ML/MIN/1.73M2 — SIGNIFICANT CHANGE UP
EOSINOPHIL # BLD AUTO: 0.04 K/UL — SIGNIFICANT CHANGE UP (ref 0–0.5)
EOSINOPHIL NFR BLD AUTO: 0.3 % — SIGNIFICANT CHANGE UP (ref 0–6)
FLUAV SUBTYP SPEC NAA+PROBE: SIGNIFICANT CHANGE UP
FLUBV RNA SPEC QL NAA+PROBE: SIGNIFICANT CHANGE UP
GLUCOSE SERPL-MCNC: 100 MG/DL — HIGH (ref 70–99)
HADV DNA SPEC QL NAA+PROBE: DETECTED
HCOV 229E RNA SPEC QL NAA+PROBE: SIGNIFICANT CHANGE UP
HCOV HKU1 RNA SPEC QL NAA+PROBE: SIGNIFICANT CHANGE UP
HCOV NL63 RNA SPEC QL NAA+PROBE: SIGNIFICANT CHANGE UP
HCOV OC43 RNA SPEC QL NAA+PROBE: SIGNIFICANT CHANGE UP
HCT VFR BLD CALC: 42.1 % — SIGNIFICANT CHANGE UP (ref 39–50)
HGB BLD-MCNC: 12.5 G/DL — LOW (ref 13–17)
HMPV RNA SPEC QL NAA+PROBE: SIGNIFICANT CHANGE UP
HPIV1 RNA SPEC QL NAA+PROBE: SIGNIFICANT CHANGE UP
HPIV2 RNA SPEC QL NAA+PROBE: SIGNIFICANT CHANGE UP
HPIV3 RNA SPEC QL NAA+PROBE: SIGNIFICANT CHANGE UP
HPIV4 RNA SPEC QL NAA+PROBE: SIGNIFICANT CHANGE UP
IANC: 8.95 K/UL — HIGH (ref 1.8–7.4)
IMM GRANULOCYTES NFR BLD AUTO: 0.4 % — SIGNIFICANT CHANGE UP (ref 0–0.9)
LYMPHOCYTES # BLD AUTO: 1.09 K/UL — SIGNIFICANT CHANGE UP (ref 1–3.3)
LYMPHOCYTES # BLD AUTO: 9.2 % — LOW (ref 13–44)
M PNEUMO DNA SPEC QL NAA+PROBE: SIGNIFICANT CHANGE UP
MCHC RBC-ENTMCNC: 23.9 PG — LOW (ref 27–34)
MCHC RBC-ENTMCNC: 29.7 GM/DL — LOW (ref 32–36)
MCV RBC AUTO: 80.7 FL — SIGNIFICANT CHANGE UP (ref 80–100)
MONOCYTES # BLD AUTO: 1.63 K/UL — HIGH (ref 0–0.9)
MONOCYTES NFR BLD AUTO: 13.8 % — SIGNIFICANT CHANGE UP (ref 2–14)
NEUTROPHILS # BLD AUTO: 8.95 K/UL — HIGH (ref 1.8–7.4)
NEUTROPHILS NFR BLD AUTO: 76 % — SIGNIFICANT CHANGE UP (ref 43–77)
NRBC # BLD: 0 /100 WBCS — SIGNIFICANT CHANGE UP (ref 0–0)
NRBC # FLD: 0 K/UL — SIGNIFICANT CHANGE UP (ref 0–0)
PLATELET # BLD AUTO: 175 K/UL — SIGNIFICANT CHANGE UP (ref 150–400)
POTASSIUM SERPL-MCNC: 4.4 MMOL/L — SIGNIFICANT CHANGE UP (ref 3.5–5.3)
POTASSIUM SERPL-SCNC: 4.4 MMOL/L — SIGNIFICANT CHANGE UP (ref 3.5–5.3)
PROT SERPL-MCNC: 7.7 G/DL — SIGNIFICANT CHANGE UP (ref 6–8.3)
RAPID RVP RESULT: DETECTED
RBC # BLD: 5.22 M/UL — SIGNIFICANT CHANGE UP (ref 4.2–5.8)
RBC # FLD: 15.5 % — HIGH (ref 10.3–14.5)
RSV RNA SPEC QL NAA+PROBE: SIGNIFICANT CHANGE UP
RV+EV RNA SPEC QL NAA+PROBE: SIGNIFICANT CHANGE UP
SARS-COV-2 RNA SPEC QL NAA+PROBE: SIGNIFICANT CHANGE UP
SODIUM SERPL-SCNC: 132 MMOL/L — LOW (ref 135–145)
WBC # BLD: 11.8 K/UL — HIGH (ref 3.8–10.5)
WBC # FLD AUTO: 11.8 K/UL — HIGH (ref 3.8–10.5)

## 2023-02-01 PROCEDURE — 71046 X-RAY EXAM CHEST 2 VIEWS: CPT | Mod: 26

## 2023-02-01 PROCEDURE — 99284 EMERGENCY DEPT VISIT MOD MDM: CPT

## 2023-02-01 RX ORDER — ACETAMINOPHEN 500 MG
1000 TABLET ORAL ONCE
Refills: 0 | Status: COMPLETED | OUTPATIENT
Start: 2023-02-01 | End: 2023-02-01

## 2023-02-01 RX ORDER — IBUPROFEN 200 MG
1 TABLET ORAL
Qty: 20 | Refills: 0
Start: 2023-02-01 | End: 2023-02-05

## 2023-02-01 RX ORDER — KETOROLAC TROMETHAMINE 30 MG/ML
15 SYRINGE (ML) INJECTION ONCE
Refills: 0 | Status: DISCONTINUED | OUTPATIENT
Start: 2023-02-01 | End: 2023-02-01

## 2023-02-01 RX ORDER — ACETAMINOPHEN 500 MG
2 TABLET ORAL
Qty: 60 | Refills: 0
Start: 2023-02-01 | End: 2023-02-05

## 2023-02-01 RX ORDER — SODIUM CHLORIDE 9 MG/ML
1000 INJECTION INTRAMUSCULAR; INTRAVENOUS; SUBCUTANEOUS ONCE
Refills: 0 | Status: COMPLETED | OUTPATIENT
Start: 2023-02-01 | End: 2023-02-01

## 2023-02-01 RX ADMIN — Medication 15 MILLIGRAM(S): at 03:33

## 2023-02-01 RX ADMIN — Medication 15 MILLIGRAM(S): at 03:48

## 2023-02-01 RX ADMIN — SODIUM CHLORIDE 1000 MILLILITER(S): 9 INJECTION INTRAMUSCULAR; INTRAVENOUS; SUBCUTANEOUS at 03:33

## 2023-02-01 RX ADMIN — Medication 400 MILLIGRAM(S): at 04:46

## 2023-02-01 RX ADMIN — Medication 1000 MILLIGRAM(S): at 05:01

## 2023-02-01 NOTE — ED PROVIDER NOTE - ATTENDING CONTRIBUTION TO CARE
HPI: 42-year-old male past medical history of hypertension presents with complaints of body aches, cough, chest pressure, fever.  Patient states he has began feeling rundown 3 days ago and over the past 2 days has been having worsening body aches cough and fever Tmax 102 at home patient took Tylenol prior to arrival to the ER. pt denies any sob, abdominal pain, v/d, dysuria, numbness, headache. no recent travel or ill contacts. is under a lot of stress From work and getting a divorce at this time.  Just moved to a new place and is not sleeping well otherwise.    Exam is significant for uncomfortable appearing male but not in acute distress and no respiratory distress.  Heart is tachycardic lungs are clear auscultation abdomen is soft and nontender skin is clear otherwise.  Oropharynx is normal.    MDM: 42-year-old male with hypertension only that is being weaned off with amlodipine that is here for generalized body aches fevers and chills with a cough and fever T-max at home of 102.  Likely viral infection.  Patient is also stressed and not sleeping well at home.  Will at this time obtain work-up including RVP swab, chest x-ray provide IV fluids and medications and reassess.

## 2023-02-01 NOTE — ED PROVIDER NOTE - PROGRESS NOTE DETAILS
MOY: Patient was reassessed and states he is feeling improvement since arrival status post medications.  Patient was found to have adenovirus on his RVP swab.  Chest x-ray was read as clear by radiology labs are otherwise unremarkable.  Explained to patient the findings that we found today and the treatment plan which includes rest, hydration at home, and over-the-counter medications as needed to treat symptomatically.  Worsening symptoms lead to return precautions to the hospital otherwise follow-up with PMD.  Prescriptions will be sent to his pharmacy of choice

## 2023-02-01 NOTE — ED PROVIDER NOTE - OBJECTIVE STATEMENT
42-year-old male past medical history of hypertension presents with complaints of body aches, cough, chest pressure, fever.  Patient states he has began feeling rundown 3 days ago and over the past 2 days has been having worsening body aches cough and fever Tmax 102 at home patient took Tylenol prior to arrival to the ER  pt denies any sob, abdominal pain, v/d, dysuria, numbness, ha  no recent travel or ill contacts

## 2023-02-01 NOTE — ED ADULT NURSE NOTE - NSIMPLEMENTINTERV_GEN_ALL_ED
Implemented All Universal Safety Interventions:  [place] to call system. Call bell, personal items and telephone within reach. Instruct patient to call for assistance. Room bathroom lighting operational. Non-slip footwear when patient is off stretcher. Physically safe environment: no spills, clutter or unnecessary equipment. Stretcher in lowest position, wheels locked, appropriate side rails in place.

## 2023-02-01 NOTE — ED ADULT NURSE NOTE - OBJECTIVE STATEMENT
42-year-old male A*Ox4,  hx HTN c/o  body aches, cough, chest pressure, fever. Endorses fatigue, sob, and worsening body aches, max temp at home 102. Denies recent travel, sick contacts, n/v/d. Lungs clear. 20g placed in left a/c labs drawn pt medicated.

## 2023-02-01 NOTE — ED PROVIDER NOTE - NS ED ROS FT
Constitutional: +fever, chills.  Eyes:  No visual changes  ENMT:  No neck pain  Cardiac:  No chest pain  Respiratory:  +cough  GI:  No nausea, vomiting, abdominal pain.  :  No dysuria, hematuria  MS:  No back pain.  Neuro:  No headache or lightheadedness  Skin:  No skin rash  Except as documented in the HPI,  all other systems are negative.

## 2023-02-01 NOTE — ED PROVIDER NOTE - CLINICAL SUMMARY MEDICAL DECISION MAKING FREE TEXT BOX
42-year-old male presents with body aches, cough, fever.  Found to be febrile here in the ER looks lethargic in the ER however alert oriented x3 tolerating p.o. normotensive satting well on room air lungs clear abdomen soft nontender nondistended cap refill less than 2 seconds.  At this time symptoms likely viral upper respiratory infection secondary to flu or COVID.  Given his symptoms we will evaluate labs x-ray fluids and Toradol for pain and fever control x-ray to evaluate for any focal consolidation.

## 2023-02-01 NOTE — ED PROVIDER NOTE - PATIENT PORTAL LINK FT
You can access the FollowMyHealth Patient Portal offered by [facility] by registering at the following website: [email or web address]. By joining [facility]’s FollowMyHealth portal, you will also be able to view your health information using other applications (apps) compatible with our system.

## 2023-02-01 NOTE — ED PROVIDER NOTE - NSFOLLOWUPINSTRUCTIONS_ED_ALL_ED_FT
Continue current meds.  Would consider changing nebs to PRN.  Await pulmonary input. You were seen in the emergency department today for generalized weakness and illness and was found to have something called adenovirus.      This is a virus that is treated symptomatically meaning if you have symptoms such as a fever or pain you take fever pain medication such as Tylenol or Motrin which are being prescribed to your pharmacy.  You should also get plenty of rest and hydrate by drinking lots of fluids at home.      Take 650mg Tylenol every 6 hours as needed or 600mg ibuprofen every 8 hours as needed. Always take ibuprofen with food. You make take both Tylenol and ibuprofen as described above if one medication is not enough for your pain/bodyache/fever.      Please stay away from other people as it is contagious.  Your body should fight this infection within the next few days and you should be improving over the next few days.      f your symptoms worsen, you cannot eat or drink without throwing up, you are weak to the point where you cannot do your daily activities come back to the emergency department for further care and work-up. Otherwise follow-up with your doctor to make sure you are getting better and not worse..

## 2023-03-07 ENCOUNTER — RX RENEWAL (OUTPATIENT)
Age: 43
End: 2023-03-07

## 2023-04-07 ENCOUNTER — TRANSCRIPTION ENCOUNTER (OUTPATIENT)
Age: 43
End: 2023-04-07

## 2023-04-07 RX ORDER — CLINDAMYCIN PHOSPHATE 10 MG/ML
1 LOTION TOPICAL
Qty: 1 | Refills: 5 | Status: ACTIVE | COMMUNITY
Start: 2022-08-30 | End: 1900-01-01

## 2023-04-07 RX ORDER — TRIAMCINOLONE ACETONIDE 1 MG/G
0.1 OINTMENT TOPICAL
Qty: 80 | Refills: 1 | Status: ACTIVE | COMMUNITY
Start: 2022-08-30 | End: 1900-01-01

## 2023-06-13 ENCOUNTER — RX RENEWAL (OUTPATIENT)
Age: 43
End: 2023-06-13

## 2023-09-14 ENCOUNTER — NON-APPOINTMENT (OUTPATIENT)
Age: 43
End: 2023-09-14

## 2023-09-15 ENCOUNTER — EMERGENCY (EMERGENCY)
Facility: HOSPITAL | Age: 43
LOS: 1 days | Discharge: HOME | End: 2023-09-15
Admitting: EMERGENCY MEDICINE
Payer: COMMERCIAL

## 2023-09-15 VITALS
RESPIRATION RATE: 17 BRPM | DIASTOLIC BLOOD PRESSURE: 85 MMHG | OXYGEN SATURATION: 96 % | SYSTOLIC BLOOD PRESSURE: 137 MMHG | HEART RATE: 90 BPM

## 2023-09-15 VITALS
TEMPERATURE: 99 F | HEIGHT: 67 IN | RESPIRATION RATE: 16 BRPM | DIASTOLIC BLOOD PRESSURE: 90 MMHG | HEART RATE: 92 BPM | SYSTOLIC BLOOD PRESSURE: 145 MMHG | WEIGHT: 250 LBS | OXYGEN SATURATION: 95 %

## 2023-09-15 DIAGNOSIS — R07.9 CHEST PAIN, UNSPECIFIED: ICD-10-CM

## 2023-09-15 DIAGNOSIS — I10 ESSENTIAL (PRIMARY) HYPERTENSION: ICD-10-CM

## 2023-09-15 DIAGNOSIS — R42 DIZZINESS AND GIDDINESS: ICD-10-CM

## 2023-09-15 LAB
ALBUMIN SERPL ELPH-MCNC: 4 G/DL — SIGNIFICANT CHANGE UP (ref 3.4–5)
ALP SERPL-CCNC: 72 U/L — SIGNIFICANT CHANGE UP (ref 40–120)
ALT FLD-CCNC: 33 U/L — SIGNIFICANT CHANGE UP (ref 12–42)
ANION GAP SERPL CALC-SCNC: 7 MMOL/L — LOW (ref 9–16)
APTT BLD: 36.9 SEC — HIGH (ref 24.5–35.6)
AST SERPL-CCNC: 31 U/L — SIGNIFICANT CHANGE UP (ref 15–37)
BASOPHILS # BLD AUTO: 0.05 K/UL — SIGNIFICANT CHANGE UP (ref 0–0.2)
BASOPHILS NFR BLD AUTO: 1 % — SIGNIFICANT CHANGE UP (ref 0–2)
BILIRUB SERPL-MCNC: 0.3 MG/DL — SIGNIFICANT CHANGE UP (ref 0.2–1.2)
BUN SERPL-MCNC: 14 MG/DL — SIGNIFICANT CHANGE UP (ref 7–23)
CALCIUM SERPL-MCNC: 9.4 MG/DL — SIGNIFICANT CHANGE UP (ref 8.5–10.5)
CHLORIDE SERPL-SCNC: 103 MMOL/L — SIGNIFICANT CHANGE UP (ref 96–108)
CO2 SERPL-SCNC: 28 MMOL/L — SIGNIFICANT CHANGE UP (ref 22–31)
CREAT SERPL-MCNC: 1 MG/DL — SIGNIFICANT CHANGE UP (ref 0.5–1.3)
EGFR: 95 ML/MIN/1.73M2 — SIGNIFICANT CHANGE UP
EOSINOPHIL # BLD AUTO: 0.04 K/UL — SIGNIFICANT CHANGE UP (ref 0–0.5)
EOSINOPHIL NFR BLD AUTO: 0.8 % — SIGNIFICANT CHANGE UP (ref 0–6)
GLUCOSE SERPL-MCNC: 94 MG/DL — SIGNIFICANT CHANGE UP (ref 70–99)
HCT VFR BLD CALC: 43.8 % — SIGNIFICANT CHANGE UP (ref 39–50)
HGB BLD-MCNC: 13.1 G/DL — SIGNIFICANT CHANGE UP (ref 13–17)
IMM GRANULOCYTES NFR BLD AUTO: 0.4 % — SIGNIFICANT CHANGE UP (ref 0–0.9)
INR BLD: 0.98 — SIGNIFICANT CHANGE UP (ref 0.85–1.18)
LYMPHOCYTES # BLD AUTO: 1.2 K/UL — SIGNIFICANT CHANGE UP (ref 1–3.3)
LYMPHOCYTES # BLD AUTO: 24.9 % — SIGNIFICANT CHANGE UP (ref 13–44)
MAGNESIUM SERPL-MCNC: 2 MG/DL — SIGNIFICANT CHANGE UP (ref 1.6–2.6)
MCHC RBC-ENTMCNC: 24.4 PG — LOW (ref 27–34)
MCHC RBC-ENTMCNC: 29.9 GM/DL — LOW (ref 32–36)
MCV RBC AUTO: 81.6 FL — SIGNIFICANT CHANGE UP (ref 80–100)
MONOCYTES # BLD AUTO: 0.34 K/UL — SIGNIFICANT CHANGE UP (ref 0–0.9)
MONOCYTES NFR BLD AUTO: 7.1 % — SIGNIFICANT CHANGE UP (ref 2–14)
NEUTROPHILS # BLD AUTO: 3.16 K/UL — SIGNIFICANT CHANGE UP (ref 1.8–7.4)
NEUTROPHILS NFR BLD AUTO: 65.8 % — SIGNIFICANT CHANGE UP (ref 43–77)
NRBC # BLD: 0 /100 WBCS — SIGNIFICANT CHANGE UP (ref 0–0)
NT-PROBNP SERPL-SCNC: 8 PG/ML — SIGNIFICANT CHANGE UP
PLATELET # BLD AUTO: 183 K/UL — SIGNIFICANT CHANGE UP (ref 150–400)
POTASSIUM SERPL-MCNC: 3.9 MMOL/L — SIGNIFICANT CHANGE UP (ref 3.5–5.3)
POTASSIUM SERPL-SCNC: 3.9 MMOL/L — SIGNIFICANT CHANGE UP (ref 3.5–5.3)
PROT SERPL-MCNC: 8.3 G/DL — HIGH (ref 6.4–8.2)
PROTHROM AB SERPL-ACNC: 11.1 SEC — SIGNIFICANT CHANGE UP (ref 9.5–13)
RBC # BLD: 5.37 M/UL — SIGNIFICANT CHANGE UP (ref 4.2–5.8)
RBC # FLD: 15.4 % — HIGH (ref 10.3–14.5)
SODIUM SERPL-SCNC: 138 MMOL/L — SIGNIFICANT CHANGE UP (ref 132–145)
TROPONIN I, HIGH SENSITIVITY RESULT: 5.1 NG/L — SIGNIFICANT CHANGE UP
TROPONIN I, HIGH SENSITIVITY RESULT: 6.6 NG/L — SIGNIFICANT CHANGE UP
WBC # BLD: 4.81 K/UL — SIGNIFICANT CHANGE UP (ref 3.8–10.5)
WBC # FLD AUTO: 4.81 K/UL — SIGNIFICANT CHANGE UP (ref 3.8–10.5)

## 2023-09-15 PROCEDURE — 99285 EMERGENCY DEPT VISIT HI MDM: CPT

## 2023-09-15 PROCEDURE — 71045 X-RAY EXAM CHEST 1 VIEW: CPT | Mod: 26

## 2023-09-15 NOTE — ED ADULT NURSE NOTE - CHIEF COMPLAINT QUOTE
Pt states midsternal chest pain x 3 days with worsening dizziness today. pt denies sob or cough. hx htn

## 2023-09-15 NOTE — ED PROVIDER NOTE - OBJECTIVE STATEMENT
44 y/o Male with PMHx of HTN(on Amolodipine) presenting with chest pain, dizziness, and lightheadedness today. Patient states that he was at a meeting today and felt like he was going to pass out so he got up and left. Patient states that he's been experiencing the chest pain for the past couple of days. Patient states that he recently started going to the gym to lose weight. Denies known heart, lung, or kidney problems. Denies fever/chills, weakness, and numbness.

## 2023-09-15 NOTE — ED PROVIDER NOTE - CLINICAL SUMMARY MEDICAL DECISION MAKING FREE TEXT BOX
42 y/o Male with PMHx of HTN presenting with chest pain, dizziness, and lightheadedness today. Plan to obtain chest x-ray, EKG, labs, and reassess. Patient is well appearing and vibrant.

## 2023-09-29 PROBLEM — I10 ESSENTIAL (PRIMARY) HYPERTENSION: Chronic | Status: ACTIVE | Noted: 2023-09-15

## 2023-10-13 ENCOUNTER — APPOINTMENT (OUTPATIENT)
Dept: INTERNAL MEDICINE | Facility: CLINIC | Age: 43
End: 2023-10-13

## 2024-02-15 ENCOUNTER — RX RENEWAL (OUTPATIENT)
Age: 44
End: 2024-02-15

## 2024-03-28 ENCOUNTER — RX RENEWAL (OUTPATIENT)
Age: 44
End: 2024-03-28

## 2024-04-02 ENCOUNTER — APPOINTMENT (OUTPATIENT)
Dept: INTERNAL MEDICINE | Facility: CLINIC | Age: 44
End: 2024-04-02
Payer: COMMERCIAL

## 2024-04-02 VITALS
OXYGEN SATURATION: 96 % | WEIGHT: 253 LBS | SYSTOLIC BLOOD PRESSURE: 126 MMHG | TEMPERATURE: 97.8 F | HEIGHT: 67 IN | BODY MASS INDEX: 39.71 KG/M2 | DIASTOLIC BLOOD PRESSURE: 84 MMHG | HEART RATE: 75 BPM

## 2024-04-02 DIAGNOSIS — Z13.228 ENCOUNTER FOR SCREENING FOR OTHER METABOLIC DISORDERS: ICD-10-CM

## 2024-04-02 DIAGNOSIS — I10 ESSENTIAL (PRIMARY) HYPERTENSION: ICD-10-CM

## 2024-04-02 DIAGNOSIS — Z12.11 ENCOUNTER FOR SCREENING FOR MALIGNANT NEOPLASM OF COLON: ICD-10-CM

## 2024-04-02 DIAGNOSIS — Z00.00 ENCOUNTER FOR GENERAL ADULT MEDICAL EXAMINATION W/OUT ABNORMAL FINDINGS: ICD-10-CM

## 2024-04-02 DIAGNOSIS — E66.01 MORBID (SEVERE) OBESITY DUE TO EXCESS CALORIES: ICD-10-CM

## 2024-04-02 DIAGNOSIS — E78.5 HYPERLIPIDEMIA, UNSPECIFIED: ICD-10-CM

## 2024-04-02 PROCEDURE — 99396 PREV VISIT EST AGE 40-64: CPT

## 2024-04-02 PROCEDURE — G0444 DEPRESSION SCREEN ANNUAL: CPT | Mod: 59

## 2024-04-02 PROCEDURE — 36415 COLL VENOUS BLD VENIPUNCTURE: CPT

## 2024-04-02 NOTE — HEALTH RISK ASSESSMENT
[Good] : ~his/her~  mood as  good [No] : In the past 12 months have you used drugs other than those required for medical reasons? No [No falls in past year] : Patient reported no falls in the past year [0] : 2) Feeling down, depressed, or hopeless: Not at all (0) [Hepatitis C test offered] : Hepatitis C test offered [HIV Test offered] : HIV Test offered [Alone] : lives alone [Employed] : employed [] :  [Feels Safe at Home] : Feels safe at home [Reports changes in dental health] : Reports changes in dental health [Carbon Monoxide Detector] : carbon monoxide detector [Smoke Detector] : smoke detector [Safety elements used in home] : safety elements used in home [Seat Belt] :  uses seat belt [Sunscreen] : uses sunscreen [Never] : Never [PHQ-2 Negative - No further assessment needed] : PHQ-2 Negative - No further assessment needed [FreeTextEntry1] : no [de-identified] : no [de-identified] : no [de-identified] : recently started the gym [[id number]] : 0 [de-identified] : good [Change in mental status noted] : No change in mental status noted [Behavior] : denies difficulty with behavior [Language] : denies difficulty with language [Learning/Retaining New Information] : denies difficulty learning/retaining new information [Handling Complex Tasks] : denies difficulty handling complex tasks [Spatial Ability and Orientation] : denies difficulty with spatial ability and orientation [Reasoning] : denies difficulty with reasoning [Sexually Active] : not sexually active [Reports changes in hearing] : Reports no changes in hearing [Reports changes in vision] : Reports no changes in vision [de-identified] : to have braces soon [ColonoscopyComments] : never had one done

## 2024-04-02 NOTE — PHYSICAL EXAM
[No Acute Distress] : no acute distress [Well Nourished] : well nourished [Well Developed] : well developed [Well-Appearing] : well-appearing [Normal Sclera/Conjunctiva] : normal sclera/conjunctiva [EOMI] : extraocular movements intact [PERRL] : pupils equal round and reactive to light [Normal Oropharynx] : the oropharynx was normal [Normal Outer Ear/Nose] : the outer ears and nose were normal in appearance [No Lymphadenopathy] : no lymphadenopathy [No JVD] : no jugular venous distention [Thyroid Normal, No Nodules] : the thyroid was normal and there were no nodules present [Supple] : supple [No Respiratory Distress] : no respiratory distress  [No Accessory Muscle Use] : no accessory muscle use [Normal Rate] : normal rate  [Clear to Auscultation] : lungs were clear to auscultation bilaterally [Normal S1, S2] : normal S1 and S2 [Regular Rhythm] : with a regular rhythm [No Carotid Bruits] : no carotid bruits [No Murmur] : no murmur heard [No Varicosities] : no varicosities [No Abdominal Bruit] : a ~M bruit was not heard ~T in the abdomen [Pedal Pulses Present] : the pedal pulses are present [No Palpable Aorta] : no palpable aorta [No Edema] : there was no peripheral edema [No Extremity Clubbing/Cyanosis] : no extremity clubbing/cyanosis [Soft] : abdomen soft [Non Tender] : non-tender [Non-distended] : non-distended [No Masses] : no abdominal mass palpated [No HSM] : no HSM [Normal Bowel Sounds] : normal bowel sounds [Normal Posterior Cervical Nodes] : no posterior cervical lymphadenopathy [Normal Anterior Cervical Nodes] : no anterior cervical lymphadenopathy [No CVA Tenderness] : no CVA  tenderness [No Spinal Tenderness] : no spinal tenderness [No Joint Swelling] : no joint swelling [Grossly Normal Strength/Tone] : grossly normal strength/tone [Coordination Grossly Intact] : coordination grossly intact [No Rash] : no rash [Normal Gait] : normal gait [No Focal Deficits] : no focal deficits [Deep Tendon Reflexes (DTR)] : deep tendon reflexes were 2+ and symmetric [Normal Affect] : the affect was normal [Normal Insight/Judgement] : insight and judgment were intact

## 2024-04-02 NOTE — ASSESSMENT
[FreeTextEntry1] : annual exam   HTN- BP well controlled   Blood work drawn in office today   health maintenance- well adult   obesity- BMI 39 has tried weight loss withg diet and exercise with minimal improvement will try Mounjaro

## 2024-04-04 LAB
ALBUMIN SERPL ELPH-MCNC: 4.7 G/DL
ALP BLD-CCNC: 60 U/L
ALT SERPL-CCNC: 18 U/L
ANION GAP SERPL CALC-SCNC: 10 MMOL/L
AST SERPL-CCNC: 22 U/L
BASOPHILS # BLD AUTO: 0.05 K/UL
BASOPHILS NFR BLD AUTO: 0.9 %
BILIRUB SERPL-MCNC: 0.2 MG/DL
BUN SERPL-MCNC: 13 MG/DL
C TRACH RRNA SPEC QL NAA+PROBE: NOT DETECTED
CALCIUM SERPL-MCNC: 9.6 MG/DL
CHLORIDE SERPL-SCNC: 103 MMOL/L
CHOLEST SERPL-MCNC: 274 MG/DL
CO2 SERPL-SCNC: 26 MMOL/L
CREAT SERPL-MCNC: 0.98 MG/DL
EGFR: 98 ML/MIN/1.73M2
EOSINOPHIL # BLD AUTO: 0.09 K/UL
EOSINOPHIL NFR BLD AUTO: 1.6 %
ESTIMATED AVERAGE GLUCOSE: 120 MG/DL
FOLATE SERPL-MCNC: 6.8 NG/ML
GLUCOSE SERPL-MCNC: 86 MG/DL
HBA1C MFR BLD HPLC: 5.8 %
HCT VFR BLD CALC: 49.3 %
HCV AB SER QL: NONREACTIVE
HCV S/CO RATIO: 0.23 S/CO
HDLC SERPL-MCNC: 68 MG/DL
HGB BLD-MCNC: 14.6 G/DL
HIV1+2 AB SPEC QL IA.RAPID: NONREACTIVE
IMM GRANULOCYTES NFR BLD AUTO: 0.5 %
IRON SATN MFR SERPL: 22 %
IRON SERPL-MCNC: 87 UG/DL
LDLC SERPL CALC-MCNC: 175 MG/DL
LYMPHOCYTES # BLD AUTO: 1.76 K/UL
LYMPHOCYTES NFR BLD AUTO: 30.9 %
MAN DIFF?: NORMAL
MCHC RBC-ENTMCNC: 24.3 PG
MCHC RBC-ENTMCNC: 29.6 GM/DL
MCV RBC AUTO: 82.2 FL
MONOCYTES # BLD AUTO: 0.42 K/UL
MONOCYTES NFR BLD AUTO: 7.4 %
N GONORRHOEA RRNA SPEC QL NAA+PROBE: NOT DETECTED
NEUTROPHILS # BLD AUTO: 3.35 K/UL
NEUTROPHILS NFR BLD AUTO: 58.7 %
NONHDLC SERPL-MCNC: 205 MG/DL
PLATELET # BLD AUTO: 167 K/UL
POTASSIUM SERPL-SCNC: 4.8 MMOL/L
PROT SERPL-MCNC: 7.8 G/DL
RBC # BLD: 6 M/UL
RBC # FLD: 16.9 %
SODIUM SERPL-SCNC: 138 MMOL/L
SOURCE AMPLIFICATION: NORMAL
TIBC SERPL-MCNC: 395 UG/DL
TRIGL SERPL-MCNC: 165 MG/DL
TSH SERPL-ACNC: 0.92 UIU/ML
UIBC SERPL-MCNC: 309 UG/DL
VIT B12 SERPL-MCNC: 507 PG/ML
WBC # FLD AUTO: 5.7 K/UL

## 2024-05-06 ENCOUNTER — RX RENEWAL (OUTPATIENT)
Age: 44
End: 2024-05-06

## 2024-05-29 ENCOUNTER — RX RENEWAL (OUTPATIENT)
Age: 44
End: 2024-05-29

## 2024-05-29 RX ORDER — AMLODIPINE BESYLATE 5 MG/1
5 TABLET ORAL
Qty: 30 | Refills: 0 | Status: ACTIVE | COMMUNITY
Start: 2022-12-19 | End: 1900-01-01

## 2024-06-06 ENCOUNTER — APPOINTMENT (OUTPATIENT)
Dept: BARIATRICS | Facility: CLINIC | Age: 44
End: 2024-06-06

## 2024-06-24 RX ORDER — TIRZEPATIDE 2.5 MG/.5ML
2.5 INJECTION, SOLUTION SUBCUTANEOUS
Qty: 4 | Refills: 2 | Status: ACTIVE | COMMUNITY
Start: 2024-06-24 | End: 1900-01-01

## 2024-06-24 RX ORDER — TIRZEPATIDE 2.5 MG/.5ML
2.5 INJECTION, SOLUTION SUBCUTANEOUS
Qty: 12 | Refills: 0 | Status: DISCONTINUED | COMMUNITY
Start: 2024-04-02 | End: 2024-06-24

## 2024-07-09 ENCOUNTER — RX RENEWAL (OUTPATIENT)
Age: 44
End: 2024-07-09

## 2024-08-20 NOTE — ED PROVIDER NOTE - IV ALTEPLASE EXCL REL HIDDEN
Quality 47: Advance Care Plan: Advance Care Planning discussed and documented; advance care plan or surrogate decision maker documented in the medical record. Detail Level: Detailed Quality 130: Documentation Of Current Medications In The Medical Record: Current Medications Documented show

## 2024-08-28 ENCOUNTER — RX RENEWAL (OUTPATIENT)
Age: 44
End: 2024-08-28

## 2024-09-26 ENCOUNTER — RX RENEWAL (OUTPATIENT)
Age: 44
End: 2024-09-26

## 2024-12-02 ENCOUNTER — RX RENEWAL (OUTPATIENT)
Age: 44
End: 2024-12-02

## 2024-12-05 ENCOUNTER — RX RENEWAL (OUTPATIENT)
Age: 44
End: 2024-12-05

## 2025-01-31 ENCOUNTER — RX RENEWAL (OUTPATIENT)
Age: 45
End: 2025-01-31

## 2025-03-03 ENCOUNTER — RX RENEWAL (OUTPATIENT)
Age: 45
End: 2025-03-03

## 2025-04-07 ENCOUNTER — RX RENEWAL (OUTPATIENT)
Age: 45
End: 2025-04-07

## 2025-05-12 ENCOUNTER — RX RENEWAL (OUTPATIENT)
Age: 45
End: 2025-05-12

## 2025-05-12 NOTE — PATIENT PROFILE ADULT - NSASFUNCLEVELADLAMBULATE_GEN_A_NUR
Brief Postoperative Note      Patient: Phillip Foster  YOB: 2005  MRN: [id number]    Date of Procedure: 5/12/2025    Pre-Op Diagnosis: Left Inguinal Hernia.    Post-Op Diagnosis:  Same.       Procedure:   Repair Left Inguinal Hernia with Mesh.     Surgeon(s):  David Herring MD    Assistant: Kay Dillard SA.    Anesthesia: General    Estimated Blood Loss (mL): Approximately 5 ml.    Complications: None    Specimens:   * No specimens in log *    Implants:  Implant Name Type Inv. Item Serial No.  Lot No. LRB No. Used Action   MESH GREGOR [id number] POLYPR SYN FLAT L PORE MFIL - SNA  MESH GREGOR [id number] POLYPR SYN FLAT L PORE MFIL NA BARD DAVOL-WD [id number] Left 1 Implanted         Drains: * No LDAs found *    Findings:  Infection Present At Time Of Surgery (PATOS) (choose all levels that have infection present):  No infection present  Other Findings: Direct left inguinal hernia.     Electronically signed by David Herring MD on 5/12/2025 at 9:02 AM  
0 = independent

## 2025-06-12 ENCOUNTER — RX RENEWAL (OUTPATIENT)
Age: 45
End: 2025-06-12

## 2025-08-06 ENCOUNTER — RX RENEWAL (OUTPATIENT)
Age: 45
End: 2025-08-06

## 2025-08-27 ENCOUNTER — APPOINTMENT (OUTPATIENT)
Dept: INTERNAL MEDICINE | Facility: CLINIC | Age: 45
End: 2025-08-27

## 2025-09-02 ENCOUNTER — RX RENEWAL (OUTPATIENT)
Age: 45
End: 2025-09-02